# Patient Record
Sex: FEMALE | Race: WHITE | Employment: OTHER
[De-identification: names, ages, dates, MRNs, and addresses within clinical notes are randomized per-mention and may not be internally consistent; named-entity substitution may affect disease eponyms.]

---

## 2017-11-13 ENCOUNTER — HOSPITAL ENCOUNTER (OUTPATIENT)
Dept: PHYSICAL THERAPY | Facility: CLINIC | Age: 62
Setting detail: THERAPIES SERIES
Discharge: HOME | End: 2017-11-13
Payer: COMMERCIAL

## 2017-11-13 PROCEDURE — 97140 MANUAL THERAPY 1/> REGIONS: CPT

## 2017-11-13 PROCEDURE — 97161 PT EVAL LOW COMPLEX 20 MIN: CPT

## 2017-11-13 PROCEDURE — 97110 THERAPEUTIC EXERCISES: CPT

## 2017-11-13 NOTE — CONSULTS
[] Bob. [address] [facility] [employer]     [address] [place] [address]      Phone: [phone]      Fax:  [phone]     Physical Therapy Upper Extremity Evaluation    Date:  2017  Patient: Chetna Hernandez  : 1955  MRN: [id number]  Physician: Dr. William Lowery: MMO  Medical Diagnosis: L shoulder impingement  Rehab Codes: M75.42  Onset Date: 10/3/17       Next 's appt:     Subjective:   CC: 10/3/17 was moving from 1 home to another. Started to feel pain after that. No true injury, just came on gradually. Do have a history of spinal stenosis. Usually symptoms from neck are worse on the L than the R.  Get sharp stabbing at scapula. When raise my arm up or try to reach across pain is worse.    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 5/5 shoulder strength all planes  3. Normal posture                   Patient goals:No shoulder pain and be able to reach across and overhead      Rehab Potential:  [x] Good  [] Fair  [] Poor   Suggested Professional Referral:  [x] No  [] Yes:  Barriers to Goal Achievement[de-identified]  [x] No  [] Yes:  Domestic Concerns:  [x] No  [] Yes:    Pt. Education:  [x] Plans/Goals, Risks/Benefits discussed  [x] Home exercise program  Method of Education: [] Verbal  [] Demo  [x] Written  Comprehension of Education:  [x] Verbalizes understanding. [] Demonstrates understanding. [x] Needs Review. [] Demonstrates/verbalizes understanding of HEP/Ed previously given.     Treatment Plan:  [x] Therapeutic Exercise  [] Modalities:  [] Dry Needling  [] Therapeutic Activity  [] Ultrasound  [] Electrical Stimulation  [] Gait Training   [] Massage       [] Lumbar/Cervical Traction  [] Neuromuscular Re-education [x] Cold [] Iontophoresis: 4 mg/mL  [x] Instruction in HEP             Dexamethasone Sodium  [x] Manual Therapy             Phosphate 40-80 mAmin  [] Aquatic Therapy                   [x] Vasocompression/    [] Other:            Game Ready          Frequency: 2 x/week for 12 visits    Todays Treatment:  Manual: 1 st rib MET, L 4th post rib MET, MFR L scalene, PA glide T1-4  Precautions:  Exercises:  Exercise Reps/ Time Weight/ Level Comments   Neck flex 10x10\"  Don't lift head off pillow   Doorway pect stretch 3x30\"     Prone Scap  retraction x20                depression x20     Wall Walk x10     Other:    Specific Instructions for next treatment:UBE warm up, Sidelying next visit    Treatment Charges: Mins Units   [x] Evaluation       [x]  Low       []  Moderate       []  High 20 1   []  Modalities     [x]  Ther Exercise 15 1   [x]  Manual Therapy 15 1   []  Ther Activities     []  Aquatics     []  Vasocompression     []  Other       TOTAL TREATMENT TIME: 50    Time in: 1713    Time Out: 1817    Electronically signed by: Priyank Mantilla PT        Physician Signature:________________________________Date:__________________  By signing above or cosigning this note, I have reviewed this plan of care and certify a need for medically necessary rehabilitation services.      *PLEASE SIGN ABOVE AND FAX BACK ALL PAGES*

## 2017-11-16 ENCOUNTER — HOSPITAL ENCOUNTER (OUTPATIENT)
Dept: PHYSICAL THERAPY | Facility: CLINIC | Age: 62
Setting detail: THERAPIES SERIES
Discharge: HOME | End: 2017-11-16
Payer: COMMERCIAL

## 2017-11-16 PROCEDURE — 97140 MANUAL THERAPY 1/> REGIONS: CPT

## 2017-11-16 PROCEDURE — 97110 THERAPEUTIC EXERCISES: CPT

## 2017-11-16 NOTE — FLOWSHEET NOTE
[] Klarissa Wang       Outpatient Physical        Therapy       [address]       Phone: [phone]       Fax: [phone] [] [facility] at [address] [facility]       Phone: [phone]       Fax: [phone] [] [facility]  [address] [facility]   Phone: [phone]   Fax:  [phone]     Physical Therapy Daily Treatment Note    Date:  2017  Patient Name:  Jose Dacosta    :  1955  MRN: [id number]  Physician: Dr. Webster Oms: MMO  Medical Diagnosis: L shoulder impingement                       Rehab Codes: M75.42  Onset Date: 10/3/17                                                               Next 's appt: unknown  Visit# / total visits:      Cancels/No Shows: 0    Subjective:    Pain:  [] Yes  [] No Location: L shoulder     Pain: 3/10  Comments: Doing well. A little sore under the arm and at the deltoid    Objective:  Manual: 1 st rib MET, L 4th post rib MET, PA glide T1-4, Posterior glide L GHJ  Precautions:standard  Exercises:  Exercise Reps/ Time Weight/ Level Comments   UBE  4'     Neck flex 10x10\"   Don't lift head off pillow   Doorway pect stretch 3x30\"       Supine thoracic extension 1/2 foam roller 3'     Prone Scap  retraction x20                  depression x20                  HAB x10     Wall Walk x10       Sidelying                                      ABD 2x15                                     ER 2x15                                     HAB x10     Other:     Specific Instructions for next treatment:Add TB exercises        Treatment Charges: Mins Units   []  Modalities     [x]  Ther Exercise 25 2   [x]  Manual Therapy 15 1   []  Ther Activities     []  Aquatics     []  Vasocompression     []  Other     Total Treatment time 40 3       Assessment: [x] Progressing toward goals. Full PROM after Manual.  Sidelying exercise mildly irritating. Hard to control scapula with HAB. [] No change. [] Other:  STG: (to be met in 6 treatments)  1. ? Pain: by 50% with overhead reach  2. ? ROM:WNL all planes  3. ? Function:Able to reach into top cabinet  4. Independent with Home Exercise Programs     LTG: (to be met in 12 treatments)  1. 0/10 L shoulder pain with reaching overhead and across body   2. 5/5 shoulder strength all planes  3. Normal posture                    Patient goals:No shoulder pain and be able to reach across and overhead    Pt. Education:  [x] Yes  [] No  [x] Reviewed Prior HEP/Ed  Method of Education: [] Verbal  [] Demo  [x] Written-Add sidelying  Comprehension of Education:  [] Verbalizes understanding. [] Demonstrates understanding. [] Needs review. [] Demonstrates/verbalizes HEP/Ed previously given. Plan: [x] Continue per plan of care.    [] Other:      Time In:1400          Time Out: 6538    Electronically signed by:  Tobi Vegas, PT

## 2017-11-20 ENCOUNTER — HOSPITAL ENCOUNTER (OUTPATIENT)
Dept: PHYSICAL THERAPY | Facility: CLINIC | Age: 62
Setting detail: THERAPIES SERIES
Discharge: HOME | End: 2017-11-20
Payer: COMMERCIAL

## 2017-11-20 PROCEDURE — 97110 THERAPEUTIC EXERCISES: CPT

## 2017-11-22 ENCOUNTER — HOSPITAL ENCOUNTER (OUTPATIENT)
Dept: PHYSICAL THERAPY | Facility: CLINIC | Age: 62
Setting detail: THERAPIES SERIES
Discharge: HOME | End: 2017-11-22
Payer: COMMERCIAL

## 2017-11-22 PROCEDURE — 97016 VASOPNEUMATIC DEVICE THERAPY: CPT

## 2017-11-22 PROCEDURE — 97140 MANUAL THERAPY 1/> REGIONS: CPT

## 2017-11-22 PROCEDURE — 97110 THERAPEUTIC EXERCISES: CPT

## 2017-11-22 NOTE — FLOWSHEET NOTE
[] Bob. [address] [facility] [employer]  [address] [place] [address]   Phone: [phone]   Fax:  [phone]     Physical Therapy Daily Treatment Note    Date:  2017  Patient Name:  Yolis Caicedo    :  1955  MRN: [id number]  Physician: Dr. Elian Tolentino: MMO  Medical Diagnosis: L shoulder impingement                       Rehab Codes: M75.42  Onset Date: 10/3/17                                                               Next 's appt: unknown  Visit# / total visits:      Cancels/No Shows: 0    Subjective:    Pain:  [] Yes  [] No Location: L shoulder     Pain: 0/10 If I raise up in front 4/10  Comments: A little sore down the upper arm today. Tried green TB at home and it hurt  Objective:  Manual: L 1st rib MET, PA glide T1-3  Precautions:standard  Exercises:  Exercise Reps/ Time Weight/ Level Comments   UBE  5'     Neck flex 10x10\"   Don't lift head off pillow   Doorway pect stretch 3x30\"       Supine thoracic extension 1/2 foam roller 3'     Levator stretch 3x30\"     Prone Scap  retraction x20                  depression x20                  HAB hold     Wall Walk HEP       Sidelying                                      ABD 2x15 2#                                    ER 2x10 1#                                    HAB 2x10 0#    Theraband                             Tricep hold Red                            MICA hold Red                            HAB hold Red                            Row hold Red    Other:Vasocompression 34 degrees, medium pressurex 15min stopped at 10 min due to error in machine   Specific Instructions for next treatment:Add TB exercises        Treatment Charges: Mins Units   []  Modalities     [x]  Ther Exercise 35 2   [x]  Manual Therapy 10 1   []  Ther Activities     []  Aquatics     [x]  Vasocompression 10 4   []  Other     Total Treatment time 55 3       Assessment: [x] Progressing toward goals.Full PROM after Manual. More sensitive with flexion and getting \"catch\" today. Referred pain L shoulder throughout treatment therefore modified with decreased weight and/reps. Also added vasocompression with reports of compression feeling good to the shoulder. [] No change. [] Other:  STG: (to be met in 6 treatments)  1. ? Pain: by 50% with overhead reach  2. ? ROM:WNL all planes  3. ? Function:Able to reach into top cabinet  4. Independent with Home Exercise Programs     LTG: (to be met in 12 treatments)  1. 0/10 L shoulder pain with reaching overhead and across body   2. 5/5 shoulder strength all planes  3. Normal posture                    Patient goals:No shoulder pain and be able to reach across and overhead    Pt. Education:  [x] Yes  [] No  [x] Reviewed Prior HEP/Ed  Method of Education: [] Verbal  [] Demo  [x] Written-Add sidelying  Comprehension of Education:  [] Verbalizes understanding. [] Demonstrates understanding. [] Needs review. [] Demonstrates/verbalizes HEP/Ed previously given. Plan: [x] Continue per plan of care.    [] Other:      Time In:1400         Time Out: 1500    Electronically signed by:  Gloria Feldman, PT

## 2017-11-27 ENCOUNTER — HOSPITAL ENCOUNTER (OUTPATIENT)
Dept: PHYSICAL THERAPY | Facility: CLINIC | Age: 62
Setting detail: THERAPIES SERIES
Discharge: HOME | End: 2017-11-27
Payer: COMMERCIAL

## 2017-11-27 PROCEDURE — 97110 THERAPEUTIC EXERCISES: CPT

## 2017-11-27 PROCEDURE — 97140 MANUAL THERAPY 1/> REGIONS: CPT

## 2017-11-27 PROCEDURE — 97016 VASOPNEUMATIC DEVICE THERAPY: CPT

## 2017-11-27 NOTE — FLOWSHEET NOTE
Activities     []  Aquatics     [x]  Vasocompression 15 1   []  Other     Total Treatment time 65 4       Assessment: [x] Progressing toward goals. Pt is able to perform more reps with less complaint of discomfort today. Continues to require cues on technique to keep scapular stabilizers active. [] No change. [] Other:  STG: (to be met in 6 treatments)  1. ? Pain: by 50% with overhead reach  2. ? ROM:WNL all planes  3. ? Function:Able to reach into top cabinet  4. Independent with Home Exercise Programs     LTG: (to be met in 12 treatments)  1. 0/10 L shoulder pain with reaching overhead and across body   2. 5/5 shoulder strength all planes  3. Normal posture                    Patient goals:No shoulder pain and be able to reach across and overhead    Pt. Education:  [x] Yes  [] No  [x] Reviewed Prior HEP/Ed  Method of Education: [] Verbal  [] Demo  [x] Written-Add sidelying  Comprehension of Education:  [] Verbalizes understanding. [] Demonstrates understanding. [] Needs review. [] Demonstrates/verbalizes HEP/Ed previously given. Plan: [x] Continue per plan of care.    [] Other:      Time In:1355        Time Out: 3842    Electronically signed by:  Citlalli Resendez, PT

## 2017-11-29 ENCOUNTER — HOSPITAL ENCOUNTER (OUTPATIENT)
Dept: PHYSICAL THERAPY | Facility: CLINIC | Age: 62
Setting detail: THERAPIES SERIES
Discharge: HOME | End: 2017-11-29
Payer: COMMERCIAL

## 2017-11-29 PROCEDURE — 97140 MANUAL THERAPY 1/> REGIONS: CPT

## 2017-11-29 PROCEDURE — 97016 VASOPNEUMATIC DEVICE THERAPY: CPT

## 2017-11-29 PROCEDURE — 97110 THERAPEUTIC EXERCISES: CPT

## 2017-12-05 ENCOUNTER — HOSPITAL ENCOUNTER (OUTPATIENT)
Dept: PHYSICAL THERAPY | Facility: CLINIC | Age: 62
Setting detail: THERAPIES SERIES
Discharge: HOME | End: 2017-12-05
Payer: COMMERCIAL

## 2017-12-05 PROCEDURE — 97110 THERAPEUTIC EXERCISES: CPT

## 2017-12-05 PROCEDURE — 97140 MANUAL THERAPY 1/> REGIONS: CPT

## 2017-12-05 NOTE — FLOWSHEET NOTE
Mins Units   []  Modalities     [x]  Ther Exercise 35 2   [x]  Manual Therapy 15 1   []  Ther Activities     []  Aquatics     []  Vasocompression     []  Other     Total Treatment time 50 3       Assessment: [x] Progressing toward goals. Pt reported 0/10 pain at end of treatment. Able to add prone strengthening without increased symptom. Manual and verbal cues for performance. Pt is to attempt to change sleeping position. [] No change. [] Other:  STG: (to be met in 6 treatments)  1. ? Pain: by 50% with overhead reach  2. ? ROM:WNL all planes  3. ? Function:Able to reach into top cabinet  4. Independent with Home Exercise Programs     LTG: (to be met in 12 treatments)  1. 0/10 L shoulder pain with reaching overhead and across body   2. 5/5 shoulder strength all planes  3. Normal posture                    Patient goals:No shoulder pain and be able to reach across and overhead    Pt. Education:  [x] Yes  [] No  [x] Reviewed Prior HEP/Ed  Method of Education: [] Verbal  [] Demo  [x] Written-Add sidelying  Comprehension of Education:  [] Verbalizes understanding. [] Demonstrates understanding. [] Needs review. [] Demonstrates/verbalizes HEP/Ed previously given. Plan: [x] Continue per plan of care.    [] Other:      Time In:1504       Time Out: 2960    Electronically signed by:  Molly Pak PT

## 2017-12-07 ENCOUNTER — HOSPITAL ENCOUNTER (OUTPATIENT)
Dept: PHYSICAL THERAPY | Facility: CLINIC | Age: 62
Setting detail: THERAPIES SERIES
Discharge: HOME | End: 2017-12-07
Payer: COMMERCIAL

## 2017-12-07 PROCEDURE — 97110 THERAPEUTIC EXERCISES: CPT

## 2017-12-07 PROCEDURE — 97140 MANUAL THERAPY 1/> REGIONS: CPT

## 2017-12-11 ENCOUNTER — APPOINTMENT (OUTPATIENT)
Dept: PHYSICAL THERAPY | Facility: CLINIC | Age: 62
End: 2017-12-11
Payer: COMMERCIAL

## 2017-12-13 ENCOUNTER — HOSPITAL ENCOUNTER (OUTPATIENT)
Dept: PHYSICAL THERAPY | Facility: CLINIC | Age: 62
Setting detail: THERAPIES SERIES
Discharge: HOME | End: 2017-12-13
Payer: COMMERCIAL

## 2017-12-13 PROCEDURE — 97140 MANUAL THERAPY 1/> REGIONS: CPT

## 2017-12-13 PROCEDURE — 97110 THERAPEUTIC EXERCISES: CPT

## 2017-12-13 NOTE — FLOWSHEET NOTE
[] Bob. [address] [facility] [employer]  [address] [place] [address]   Phone: [phone]   Fax:  [phone]     Physical Therapy Daily Treatment Note    Date:  2017  Patient Name:  Nadira Evans    :  1955  MRN: [id number]  Physician: Dr. Hardy Khanna: MMO  Medical Diagnosis: L shoulder impingement                       Rehab Codes: M75.42  Onset Date: 10/3/17                                                               Next 's appt: unknown  Visit# / total visits:      Cancels/No Shows: 0    Subjective:    Pain:  [] Yes  [] No Location: L shoulder     Pain: 0/10  Comments: Feels really good today. No pain since last visit.   Objective:  Manual: PA glide T1-4, L 1st rib MET,  FRSL T6 MOB  Precautions:standard  Exercises:  Exercise Reps/ Time Weight/ Level Comments    UBE  5'   x   Neck flex 10x10\"   Don't lift head off pillow    Wand lat stretch 10x10\"      Doorway pect stretch 3x30\"     x   Supine thoracic extension 1/2 foam roller 3'   x   Levator stretch 3x30\"      Prone Scap  retraction HEP                   depression HEP                   HAB hold      Wall Walk HEP        Sidelying                                       ABD 2x15 2#                                     ER 2x10 2#/1#                                     HAB 2x10 0#     Theraband                               Ext x20   x                          Tricep 2x20 Blue  x                           MICA 2x20 Green  x                           HAB 2x20 Green  x                           Row 2x20 Green  high x                           Lat 2x20 Blue  x                         [email or web address] 2x20 Red  x   Prone                                         I 2x10 2#  x                                     T 2x10 2#  x                                      Y 2x10 0#  x   Other:    Specific Instructions for next treatment:Continue with manual and progress strengthening        Treatment Charges: Mins Units   []  Modalities     [x]  Ther Exercise 35 2   [x]  Manual Therapy 15 1   []  Ther Activities     []  Aquatics     []  Vasocompression     []  Other     Total Treatment time 50 3       Assessment: [x] Progressing toward goals. Pt is painfree today. A little end range stiffness in the L shoulder prior to manual but resolved after. Pt able to handle higher volume of reps and some heavier bands. Sent Blue TB home with pt for HEP. Anticipate DC in 3-4 visits. STG: (to be met in 6 treatments)  1. ? Pain: by 50% with overhead reach  2. ? ROM:WNL all planes  3. ? Function:Able to reach into top cabinet  4. Independent with Home Exercise Programs     LTG: (to be met in 12 treatments)  1. 0/10 L shoulder pain with reaching overhead and across body   2. 5/5 shoulder strength all planes  3. Normal posture                    Patient goals:No shoulder pain and be able to reach across and overhead    Pt. Education:  [x] Yes  [] No  [x] Reviewed Prior HEP/Ed  Method of Education: [] Verbal  [] Demo  [x] Written  Comprehension of Education:  [x] Verbalizes understanding. [] Demonstrates understanding. [] Needs review. [] Demonstrates/verbalizes HEP/Ed previously given. Plan: [x] Continue per plan of care.    [] Other:      Time In:1100       Time Out: 1150    Electronically signed by:  Mickie Kerr, PT

## 2017-12-19 ENCOUNTER — HOSPITAL ENCOUNTER (OUTPATIENT)
Dept: PHYSICAL THERAPY | Facility: CLINIC | Age: 62
Setting detail: THERAPIES SERIES
Discharge: HOME | End: 2017-12-19
Payer: COMMERCIAL

## 2017-12-19 PROCEDURE — 97140 MANUAL THERAPY 1/> REGIONS: CPT

## 2017-12-19 PROCEDURE — 97110 THERAPEUTIC EXERCISES: CPT

## 2017-12-28 ENCOUNTER — HOSPITAL ENCOUNTER (OUTPATIENT)
Dept: PHYSICAL THERAPY | Facility: CLINIC | Age: 62
Setting detail: THERAPIES SERIES
Discharge: HOME | End: 2017-12-28
Payer: COMMERCIAL

## 2017-12-28 PROCEDURE — 97110 THERAPEUTIC EXERCISES: CPT

## 2017-12-28 PROCEDURE — 97140 MANUAL THERAPY 1/> REGIONS: CPT

## 2017-12-28 NOTE — DISCHARGE SUMMARY
[] [facility]. [address] [place] [address] [employer]  [address] [place] [address]   Phone: [phone]   Fax:  [phone]     Physical Therapy Daily Treatment Note/Discharge    Date:  2017  Patient Name:  Chetna Hernandez    :  1955  MRN: [id number]  Physician: Dr. Marco Antonio Petersen: MMO  Medical Diagnosis: L shoulder impingement                       Rehab Codes: M75.42  Onset Date: 10/3/17                                                               Next 's appt: unknown  Visit# / total visits:      Cancels/No Shows: 0    Subjective:    Pain:  [] Yes  [] No Location: L shoulder     Pain: 0/10  Comments: No pain. Hve been doing HEP.   Objective:  Manual:  R 1st rib MET,  L post rib 4 MET  Precautions:standard  Exercises:  Exercise Reps/ Time Weight/ Level Comments    UBE  5'      Neck flex 10x10\"   Don't lift head off pillow    Wand lat stretch 10x10\"      Doorway pect stretch 3x30\"     x   Supine thoracic extension 1/2 foam roller 3'   x   Levator stretch 3x30\"      Prone Scap  retraction HEP                   depression HEP                   HAB hold      Wall Walk HEP        Sidelying                                       ABD 2x15 2#                                     ER 2x10 2#/1#                                     HAB 2x10 0#     Theraband                               Ext x20 Blue  x                          Tricep x20 Blue  x                           MICA x20 Blue  x                           HAB x20 Blue  x                           Row x20 Blue/green  Low/high x                           Lat x20 Blue  x                         [email or web address] x20 green     Prone                                         I x20 3#  x                                     T x20 2#  x                                      Y x20 1#  x   ER @ 90 abd x15 0#     Other:    Specific Instructions for next treatment:Continue with manual and progress strengthening  Measurements:  L shoulder AROM WNL all planes  5/5 shldr strength all planes  Midtrap 4+/5        Treatment Charges: Mins Units   []  Modalities     [x]  Ther Exercise 30 2   [x]  Manual Therapy 10 1   []  Ther Activities     []  Aquatics     []  Vasocompression     []  Other     Total Treatment time 40 3       Assessment: [x] Progressing toward goals. Patient is meeting all goals however is still lacking midtrap strength. She has HEP that addresses this weakness. Patient is comfortable continuing IND. STG: (to be met in 6 treatments)  1. ? Pain: by 50% with overhead reach MET  2. ? ROM:WNL all planes Met  3. ? Function:Able to reach into top cabinet MET  4. Independent with Home Exercise ProgramsMET     LTG: (to be met in 12 treatments)  1. 0/10 L shoulder pain with reaching overhead and across body MET  2. 5/5 shoulder strength all planesMET  3. Normal postureMET                    Patient goals:No shoulder pain and be able to reach across and overhead    Pt. Education:  [x] Yes  [] No  [x] Reviewed Prior HEP/Ed  Method of Education: [] Verbal  [] Demo  [x] Written  Comprehension of Education:  [x] Verbalizes understanding. [] Demonstrates understanding. [] Needs review. [] Demonstrates/verbalizes HEP/Ed previously given. Plan: [x] Continue per plan of care. [x] Other:DC from PT at this time. .    Time In:1402    Time Out: 9222    Electronically signed by:  Anthony Weinberg PT

## 2018-11-29 ENCOUNTER — HOSPITAL ENCOUNTER (OUTPATIENT)
Age: 63
Discharge: HOME | End: 2018-11-29
Payer: COMMERCIAL

## 2018-11-29 LAB
-: NORMAL
ALBUMIN SERPL-MCNC: 4.5 G/DL (ref 3.5–5.2)
ALBUMIN/GLOBULIN RATIO: 1.8 (ref 1–2.5)
ALP BLD-CCNC: 61 U/L (ref 35–104)
ALT SERPL-CCNC: 17 U/L (ref 5–33)
AMORPHOUS: NORMAL
ANION GAP SERPL CALCULATED.3IONS-SCNC: 13 MMOL/L (ref 9–17)
AST SERPL-CCNC: 17 U/L
BACTERIA: NORMAL
BILIRUB SERPL-MCNC: 0.36 MG/DL (ref 0.3–1.2)
BILIRUBIN URINE: NEGATIVE
BUN BLDV-MCNC: 12 MG/DL (ref 8–23)
BUN/CREAT BLD: ABNORMAL (ref 9–20)
CALCIUM SERPL-MCNC: 9.9 MG/DL (ref 8.6–10.4)
CASTS UA: NORMAL /LPF (ref 0–8)
CHLORIDE BLD-SCNC: 105 MMOL/L (ref 98–107)
CHOLESTEROL/HDL RATIO: 2.6
CHOLESTEROL: 154 MG/DL
CO2: 27 MMOL/L (ref 20–31)
COLOR: YELLOW
COMMENT UA: ABNORMAL
CREAT SERPL-MCNC: 0.69 MG/DL (ref 0.5–0.9)
CREATININE URINE: 139.8 MG/DL (ref 28–217)
CRYSTALS, UA: NORMAL /HPF
EPITHELIAL CELLS UA: NORMAL /HPF (ref 0–5)
ESTIMATED AVERAGE GLUCOSE: 105 MG/DL
GFR AFRICAN AMERICAN: >60 ML/MIN
GFR NON-AFRICAN AMERICAN: >60 ML/MIN
GFR SERPL CREATININE-BSD FRML MDRD: ABNORMAL ML/MIN/{1.73_M2}
GFR SERPL CREATININE-BSD FRML MDRD: ABNORMAL ML/MIN/{1.73_M2}
GLUCOSE BLD-MCNC: 100 MG/DL (ref 70–99)
GLUCOSE URINE: NEGATIVE
HBA1C MFR BLD: 5.3 % (ref 4–6)
HDLC SERPL-MCNC: 59 MG/DL
HEPATITIS C ANTIBODY: NONREACTIVE
KETONES, URINE: NEGATIVE
LDL CHOLESTEROL: 67 MG/DL (ref 0–130)
LEUKOCYTE ESTERASE, URINE: ABNORMAL
MICROALBUMIN/CREAT 24H UR: <12 MG/L
MICROALBUMIN/CREAT UR-RTO: NORMAL MCG/MG CREAT
MUCUS: NORMAL
NITRITE, URINE: NEGATIVE
OTHER OBSERVATIONS UA: NORMAL
PH UA: 7 (ref 5–8)
POTASSIUM SERPL-SCNC: 4.4 MMOL/L (ref 3.7–5.3)
PROTEIN UA: NEGATIVE
RBC UA: NORMAL /HPF (ref 0–4)
RENAL EPITHELIAL, UA: NORMAL /HPF
SODIUM BLD-SCNC: 145 MMOL/L (ref 135–144)
SPECIFIC GRAVITY UA: 1.02 (ref 1–1.03)
TOTAL PROTEIN: 7 G/DL (ref 6.4–8.3)
TRICHOMONAS: NORMAL
TRIGL SERPL-MCNC: 139 MG/DL
TSH SERPL DL<=0.05 MIU/L-ACNC: 1.84 MIU/L (ref 0.3–5)
TURBIDITY: CLEAR
URINE HGB: ABNORMAL
UROBILINOGEN, URINE: NORMAL
VLDLC SERPL CALC-MCNC: NORMAL MG/DL (ref 1–30)
WBC UA: NORMAL /HPF (ref 0–5)
YEAST: NORMAL

## 2018-11-29 PROCEDURE — 84443 ASSAY THYROID STIM HORMONE: CPT

## 2018-11-29 PROCEDURE — 81001 URINALYSIS AUTO W/SCOPE: CPT

## 2018-11-29 PROCEDURE — 82570 ASSAY OF URINE CREATININE: CPT

## 2018-11-29 PROCEDURE — 83036 HEMOGLOBIN GLYCOSYLATED A1C: CPT

## 2018-11-29 PROCEDURE — 86803 HEPATITIS C AB TEST: CPT

## 2018-11-29 PROCEDURE — 80053 COMPREHEN METABOLIC PANEL: CPT

## 2018-11-29 PROCEDURE — 80061 LIPID PANEL: CPT

## 2018-11-29 PROCEDURE — 36415 COLL VENOUS BLD VENIPUNCTURE: CPT

## 2018-11-29 PROCEDURE — 82043 UR ALBUMIN QUANTITATIVE: CPT

## 2018-11-29 PROCEDURE — 82306 VITAMIN D 25 HYDROXY: CPT

## 2018-11-30 LAB — VITAMIN D 25-HYDROXY: 29.8 NG/ML (ref 30–100)

## 2019-01-21 ENCOUNTER — HOSPITAL ENCOUNTER (OUTPATIENT)
Dept: MAMMOGRAPHY | Age: 64
Discharge: HOME | End: 2019-01-23
Payer: COMMERCIAL

## 2019-01-21 ENCOUNTER — HOSPITAL ENCOUNTER (OUTPATIENT)
Dept: NON INVASIVE DIAGNOSTICS | Age: 64
Discharge: HOME | End: 2019-01-21
Payer: COMMERCIAL

## 2019-01-21 DIAGNOSIS — Z12.39 BREAST CANCER SCREENING: ICD-10-CM

## 2019-01-21 LAB
EKG ATRIAL RATE: 73 BPM
EKG P AXIS: 64 DEGREES
EKG P-R INTERVAL: 176 MS
EKG Q-T INTERVAL: 400 MS
EKG QRS DURATION: 82 MS
EKG QTC CALCULATION (BAZETT): 440 MS
EKG R AXIS: 20 DEGREES
EKG T AXIS: 66 DEGREES
EKG VENTRICULAR RATE: 73 BPM
LV EF: 65 %
LVEF MODALITY: NORMAL

## 2019-01-21 PROCEDURE — 93005 ELECTROCARDIOGRAM TRACING: CPT

## 2019-01-21 PROCEDURE — 93306 TTE W/DOPPLER COMPLETE: CPT

## 2019-01-21 PROCEDURE — 77067 SCR MAMMO BI INCL CAD: CPT

## 2019-02-05 ENCOUNTER — HOSPITAL ENCOUNTER (OUTPATIENT)
Age: 64
Discharge: HOME | End: 2019-02-05
Payer: COMMERCIAL

## 2019-02-05 LAB
ALBUMIN SERPL-MCNC: 4.5 G/DL (ref 3.5–5.2)
ALBUMIN/GLOBULIN RATIO: 2 (ref 1–2.5)
ALP BLD-CCNC: 54 U/L (ref 35–104)
ALT SERPL-CCNC: 23 U/L (ref 5–33)
ANION GAP SERPL CALCULATED.3IONS-SCNC: 14 MMOL/L (ref 9–17)
AST SERPL-CCNC: 21 U/L
BILIRUB SERPL-MCNC: 0.37 MG/DL (ref 0.3–1.2)
BUN BLDV-MCNC: 13 MG/DL (ref 8–23)
BUN/CREAT BLD: NORMAL (ref 9–20)
CALCIUM SERPL-MCNC: 9.7 MG/DL (ref 8.6–10.4)
CHLORIDE BLD-SCNC: 104 MMOL/L (ref 98–107)
CO2: 26 MMOL/L (ref 20–31)
CREAT SERPL-MCNC: 0.71 MG/DL (ref 0.5–0.9)
CREATININE URINE: 187.9 MG/DL (ref 28–217)
ESTIMATED AVERAGE GLUCOSE: 108 MG/DL
GFR AFRICAN AMERICAN: >60 ML/MIN
GFR NON-AFRICAN AMERICAN: >60 ML/MIN
GFR SERPL CREATININE-BSD FRML MDRD: NORMAL ML/MIN/{1.73_M2}
GFR SERPL CREATININE-BSD FRML MDRD: NORMAL ML/MIN/{1.73_M2}
GLUCOSE BLD-MCNC: 95 MG/DL (ref 70–99)
HBA1C MFR BLD: 5.4 % (ref 4–6)
MICROALBUMIN/CREAT 24H UR: <12 MG/L
MICROALBUMIN/CREAT UR-RTO: NORMAL MCG/MG CREAT
POTASSIUM SERPL-SCNC: 4.3 MMOL/L (ref 3.7–5.3)
SODIUM BLD-SCNC: 144 MMOL/L (ref 135–144)
TOTAL PROTEIN: 6.8 G/DL (ref 6.4–8.3)

## 2019-02-05 PROCEDURE — 80053 COMPREHEN METABOLIC PANEL: CPT

## 2019-02-05 PROCEDURE — 83036 HEMOGLOBIN GLYCOSYLATED A1C: CPT

## 2019-02-05 PROCEDURE — 82570 ASSAY OF URINE CREATININE: CPT

## 2019-02-05 PROCEDURE — 82043 UR ALBUMIN QUANTITATIVE: CPT

## 2019-02-05 PROCEDURE — 80061 LIPID PANEL: CPT

## 2019-02-05 PROCEDURE — 36415 COLL VENOUS BLD VENIPUNCTURE: CPT

## 2019-02-11 LAB
CHOLESTEROL/HDL RATIO: 3.1
CHOLESTEROL: 172 MG/DL
HDLC SERPL-MCNC: 56 MG/DL
LDL CHOLESTEROL: 84 MG/DL (ref 0–130)
TRIGL SERPL-MCNC: 160 MG/DL
VLDLC SERPL CALC-MCNC: ABNORMAL MG/DL (ref 1–30)

## 2019-02-26 ENCOUNTER — HOSPITAL ENCOUNTER (OUTPATIENT)
Age: 64
Setting detail: SPECIMEN
Discharge: HOME | End: 2019-02-26
Payer: COMMERCIAL

## 2019-03-01 LAB — SURGICAL PATHOLOGY REPORT: NORMAL

## 2019-05-16 ENCOUNTER — HOSPITAL ENCOUNTER (OUTPATIENT)
Age: 64
Discharge: HOME | End: 2019-05-16
Payer: COMMERCIAL

## 2019-05-16 LAB
ALT SERPL-CCNC: 14 U/L (ref 5–33)
CHOLESTEROL/HDL RATIO: 2.1
CHOLESTEROL: 113 MG/DL
ESTIMATED AVERAGE GLUCOSE: 105 MG/DL
HBA1C MFR BLD: 5.3 % (ref 4–6)
HDLC SERPL-MCNC: 54 MG/DL
LDL CHOLESTEROL: 35 MG/DL (ref 0–130)
TRIGL SERPL-MCNC: 122 MG/DL
VLDLC SERPL CALC-MCNC: NORMAL MG/DL (ref 1–30)

## 2019-05-16 PROCEDURE — 83036 HEMOGLOBIN GLYCOSYLATED A1C: CPT

## 2019-05-16 PROCEDURE — 36415 COLL VENOUS BLD VENIPUNCTURE: CPT

## 2019-05-16 PROCEDURE — 80061 LIPID PANEL: CPT

## 2019-05-16 PROCEDURE — 84460 ALANINE AMINO (ALT) (SGPT): CPT

## 2019-06-14 ENCOUNTER — APPOINTMENT (OUTPATIENT)
Dept: GENERAL RADIOLOGY | Age: 64
End: 2019-06-14
Payer: COMMERCIAL

## 2019-06-14 ENCOUNTER — HOSPITAL ENCOUNTER (EMERGENCY)
Age: 64
Discharge: HOME | End: 2019-06-14
Attending: EMERGENCY MEDICINE
Payer: COMMERCIAL

## 2019-06-14 VITALS
OXYGEN SATURATION: 97 % | TEMPERATURE: 98.1 F | SYSTOLIC BLOOD PRESSURE: 145 MMHG | DIASTOLIC BLOOD PRESSURE: 71 MMHG | HEART RATE: 80 BPM | HEIGHT: 62 IN | WEIGHT: 142 LBS | RESPIRATION RATE: 14 BRPM | BODY MASS INDEX: 26.13 KG/M2

## 2019-06-14 DIAGNOSIS — S71.111A LACERATION OF RIGHT THIGH, INITIAL ENCOUNTER: Primary | ICD-10-CM

## 2019-06-14 PROCEDURE — 73552 X-RAY EXAM OF FEMUR 2/>: CPT

## 2019-06-14 PROCEDURE — 12002 RPR S/N/AX/GEN/TRNK2.6-7.5CM: CPT

## 2019-06-14 PROCEDURE — 6360000002 HC RX W HCPCS: Performed by: EMERGENCY MEDICINE

## 2019-06-14 PROCEDURE — 90715 TDAP VACCINE 7 YRS/> IM: CPT | Performed by: EMERGENCY MEDICINE

## 2019-06-14 PROCEDURE — 90471 IMMUNIZATION ADMIN: CPT | Performed by: EMERGENCY MEDICINE

## 2019-06-14 PROCEDURE — 99282 EMERGENCY DEPT VISIT SF MDM: CPT

## 2019-06-14 PROCEDURE — 6370000000 HC RX 637 (ALT 250 FOR IP): Performed by: EMERGENCY MEDICINE

## 2019-06-14 RX ORDER — GINSENG 100 MG
CAPSULE ORAL ONCE
Status: COMPLETED | OUTPATIENT
Start: 2019-06-14 | End: 2019-06-14

## 2019-06-14 RX ADMIN — TETANUS TOXOID, REDUCED DIPHTHERIA TOXOID AND ACELLULAR PERTUSSIS VACCINE, ADSORBED 0.5 ML: 5; 2.5; 8; 8; 2.5 SUSPENSION INTRAMUSCULAR at 17:08

## 2019-06-14 RX ADMIN — BACITRACIN: 500 OINTMENT TOPICAL at 17:08

## 2019-06-14 NOTE — ED PROVIDER NOTES
[facility]  [address] [address]  Phone: [phone]  Fax: [phone]    Pt Name: Desi Ceuvas  MRN: [id number]  Demetrigfindio 1955  Date of evaluation: 6/14/2019      CHIEF COMPLAINT       Chief Complaint   Patient presents with    Laceration     right thigh cut with broken mirror today at approx 1600         HISTORY OF PRESENT ILLNESS     Desi Cuevas is a 66 y.o. female who presents with a laceration to the right thigh which occurred when a broken mirror fell on her thigh by accident about a half hour ago. Her tetanus is not up-to-date. She is brought in by a relative. She does not feel that she has any foreign bodies present. Systolic blood pressure is 145. The rest her vital signs are normal.  No pain at this time        REVIEW OF SYSTEMS       Other ROS negative except as noted above. PAST MEDICAL HISTORY    has a past medical history of Cervical stenosis of spine, Fibromyalgia, H/O gestational diabetes mellitus, not currently pregnant, History of bone density study, HTN (hypertension), Hypercholesterolemia, Insulin resistance, Mitral regurgitation, Osteopenia, SI (sacroiliac) joint dysfunction, and Type II or unspecified type diabetes mellitus without mention of complication, not stated as uncontrolled. SURGICAL HISTORY      has a past surgical history that includes Tonsillectomy and adenoidectomy (1963); Cystoscopy; Dilation & curettage (1984); Colposcopy (1996); Colonoscopy (2008); and Cholecystectomy, laparoscopic (age 50). CURRENT MEDICATIONS       Previous Medications    ASPIRIN 81 MG EC TABLET    Take 81 mg by mouth daily. CALCIUM CARBONATE-VIT D--200 MG-UNIT TABS    Take 1 tablet by mouth daily. CHOLECALCIFEROL (VITAMIN D) 2000 UNITS CAPS CAPSULE    Take 1 capsule by mouth daily. CYCLOBENZAPRINE (FLEXERIL) 10 MG TABLET    Take 10 mg by mouth nightly.     HYDROCHLOROTHIAZIDE (MICROZIDE) 12.5 MG CAPSULE    Take 12.5 mg by mouth daily. LISINOPRIL (PRINIVIL;ZESTRIL) 5 MG TABLET    Take 5 mg by mouth daily. METAXALONE (SKELAXIN) 800 MG TABLET    Take 800 mg by mouth 2 times daily as needed. MILNACIPRAN HCL (SAVELLA) 50 MG TABS    Take 50 mg by mouth 2 times daily. MULTIPLE VITAMIN (MULTIVITAMIN PO)    Take 1 tablet by mouth daily. VALACYCLOVIR (VALTREX) 500 MG TABLET    Take 500 mg by mouth 2 times daily as needed. ALLERGIES     is allergic to sinutab [chlorphen-pseudoephed-apap]. FAMILY HISTORY     indicated that the status of her mother is unknown. She indicated that the status of her father is unknown. She indicated that the status of her paternal grandmother is unknown. She indicated that the status of her paternal grandfather is unknown. She indicated that the status of her maternal aunt is unknown.     family history includes High Blood Pressure in her father; Other in her maternal aunt, paternal grandfather, and paternal grandmother; Other (age of onset: 71) in her mother. SOCIAL HISTORY      reports that she has never smoked. She has never used smokeless tobacco. She reports that she does not drink alcohol or use drugs. PHYSICAL EXAM     INITIAL VITALS:  height is 5' 2\" (1.575 m) and weight is 64.4 kg (142 lb). Her oral temperature is 98.1 °F (36.7 °C). Her blood pressure is 145/71 (abnormal) and her pulse is 80. Her respiration is 14 and oxygen saturation is 97%. Constitutional: The patient is alert, well-developed, in no acute distress. Vital signs as noted. Right thigh: The 4 cm laceration noted without palpable foreign body. No bleeding at this time. The thigh appears normal.  No distal neurovascular compromise is found. DIFFERENTIAL DIAGNOSIS/ MEDICAL DECISION MAKING:      Tetanus immunization is given    Laceration is repaired and wound instructions are given    Follow Exit Care instructions closely.     I have reviewed the disposition diagnosis with the patient and or with a voice recognition program.  Efforts were made to edit the dictations but occasionally words are mis-transcribed.)    [facility],, DO  Attending Emergency Physician       [facility], DO  06/14/19 5947

## 2019-06-14 NOTE — ED NOTES
Pt to ED with laceration to lateral side (posterior) of right thigh. Pt has 3.5 cm laceration noted. She was carrying a mirror when it slipped out of her hand and cut her leg.  Bleeding is controlled on arrival to ed     Garcia Pineda RN  06/14/19 0282

## 2019-07-02 ENCOUNTER — HOSPITAL ENCOUNTER (OUTPATIENT)
Dept: NON INVASIVE DIAGNOSTICS | Age: 64
Discharge: HOME | End: 2019-07-02
Payer: COMMERCIAL

## 2019-07-02 PROCEDURE — 93270 REMOTE 30 DAY ECG REV/REPORT: CPT

## 2019-07-02 PROCEDURE — 93271 ECG/MONITORING AND ANALYSIS: CPT

## 2019-07-31 ENCOUNTER — ANESTHESIA EVENT (OUTPATIENT)
Dept: ENDOSCOPY | Age: 64
End: 2019-07-31
Payer: COMMERCIAL

## 2019-08-01 ENCOUNTER — HOSPITAL ENCOUNTER (OUTPATIENT)
Age: 64
Setting detail: OUTPATIENT SURGERY
Discharge: HOME | End: 2019-08-01
Attending: INTERNAL MEDICINE | Admitting: INTERNAL MEDICINE
Payer: COMMERCIAL

## 2019-08-01 ENCOUNTER — ANESTHESIA (OUTPATIENT)
Dept: ENDOSCOPY | Age: 64
End: 2019-08-01
Payer: COMMERCIAL

## 2019-08-01 VITALS
RESPIRATION RATE: 14 BRPM | DIASTOLIC BLOOD PRESSURE: 45 MMHG | OXYGEN SATURATION: 98 % | SYSTOLIC BLOOD PRESSURE: 74 MMHG

## 2019-08-01 VITALS
BODY MASS INDEX: 25.76 KG/M2 | WEIGHT: 140 LBS | TEMPERATURE: 96.1 F | OXYGEN SATURATION: 98 % | HEIGHT: 62 IN | HEART RATE: 62 BPM | SYSTOLIC BLOOD PRESSURE: 106 MMHG | RESPIRATION RATE: 18 BRPM | DIASTOLIC BLOOD PRESSURE: 54 MMHG

## 2019-08-01 LAB — GLUCOSE BLD-MCNC: 92 MG/DL (ref 65–105)

## 2019-08-01 PROCEDURE — 3700000001 HC ADD 15 MINUTES (ANESTHESIA): Performed by: INTERNAL MEDICINE

## 2019-08-01 PROCEDURE — 3609009500 HC COLONOSCOPY DIAGNOSTIC OR SCREENING: Performed by: INTERNAL MEDICINE

## 2019-08-01 PROCEDURE — 82947 ASSAY GLUCOSE BLOOD QUANT: CPT

## 2019-08-01 PROCEDURE — 2720000010 HC SURG SUPPLY STERILE: Performed by: INTERNAL MEDICINE

## 2019-08-01 PROCEDURE — 2500000003 HC RX 250 WO HCPCS: Performed by: NURSE ANESTHETIST, CERTIFIED REGISTERED

## 2019-08-01 PROCEDURE — 7100000010 HC PHASE II RECOVERY - FIRST 15 MIN: Performed by: INTERNAL MEDICINE

## 2019-08-01 PROCEDURE — 3609010000 HC COLONOSCOPY STOMA CONTROL BLEEDING: Performed by: INTERNAL MEDICINE

## 2019-08-01 PROCEDURE — 3700000000 HC ANESTHESIA ATTENDED CARE: Performed by: INTERNAL MEDICINE

## 2019-08-01 PROCEDURE — 2709999900 HC NON-CHARGEABLE SUPPLY: Performed by: INTERNAL MEDICINE

## 2019-08-01 PROCEDURE — 88305 TISSUE EXAM BY PATHOLOGIST: CPT

## 2019-08-01 PROCEDURE — 6360000002 HC RX W HCPCS: Performed by: NURSE ANESTHETIST, CERTIFIED REGISTERED

## 2019-08-01 PROCEDURE — 36415 COLL VENOUS BLD VENIPUNCTURE: CPT

## 2019-08-01 PROCEDURE — 2580000003 HC RX 258: Performed by: INTERNAL MEDICINE

## 2019-08-01 PROCEDURE — 2580000003 HC RX 258: Performed by: NURSE ANESTHETIST, CERTIFIED REGISTERED

## 2019-08-01 PROCEDURE — 7100000011 HC PHASE II RECOVERY - ADDTL 15 MIN: Performed by: INTERNAL MEDICINE

## 2019-08-01 RX ORDER — SODIUM CHLORIDE, SODIUM LACTATE, POTASSIUM CHLORIDE, CALCIUM CHLORIDE 600; 310; 30; 20 MG/100ML; MG/100ML; MG/100ML; MG/100ML
INJECTION, SOLUTION INTRAVENOUS CONTINUOUS
Status: CANCELLED | OUTPATIENT
Start: 2019-08-01

## 2019-08-01 RX ORDER — ROSUVASTATIN CALCIUM 10 MG/1
10 TABLET, COATED ORAL NIGHTLY
COMMUNITY

## 2019-08-01 RX ORDER — SODIUM CHLORIDE 0.9 % (FLUSH) 0.9 %
10 SYRINGE (ML) INJECTION PRN
Status: CANCELLED | OUTPATIENT
Start: 2019-08-01

## 2019-08-01 RX ORDER — LIDOCAINE HYDROCHLORIDE 10 MG/ML
INJECTION, SOLUTION EPIDURAL; INFILTRATION; INTRACAUDAL; PERINEURAL PRN
Status: DISCONTINUED | OUTPATIENT
Start: 2019-08-01 | End: 2019-08-01 | Stop reason: SDUPTHER

## 2019-08-01 RX ORDER — SODIUM CHLORIDE 9 MG/ML
INJECTION, SOLUTION INTRAVENOUS ONCE
Status: COMPLETED | OUTPATIENT
Start: 2019-08-01 | End: 2019-08-01

## 2019-08-01 RX ORDER — PROPOFOL 10 MG/ML
INJECTION, EMULSION INTRAVENOUS PRN
Status: DISCONTINUED | OUTPATIENT
Start: 2019-08-01 | End: 2019-08-01 | Stop reason: SDUPTHER

## 2019-08-01 RX ORDER — LIDOCAINE HYDROCHLORIDE 10 MG/ML
1 INJECTION, SOLUTION EPIDURAL; INFILTRATION; INTRACAUDAL; PERINEURAL
Status: CANCELLED | OUTPATIENT
Start: 2019-08-01 | End: 2019-08-01

## 2019-08-01 RX ORDER — FENTANYL CITRATE 50 UG/ML
25 INJECTION, SOLUTION INTRAMUSCULAR; INTRAVENOUS EVERY 5 MIN PRN
Status: CANCELLED | OUTPATIENT
Start: 2019-08-01

## 2019-08-01 RX ORDER — SODIUM CHLORIDE 0.9 % (FLUSH) 0.9 %
10 SYRINGE (ML) INJECTION EVERY 12 HOURS SCHEDULED
Status: CANCELLED | OUTPATIENT
Start: 2019-08-01

## 2019-08-01 RX ORDER — SODIUM CHLORIDE 9 MG/ML
INJECTION, SOLUTION INTRAVENOUS CONTINUOUS PRN
Status: DISCONTINUED | OUTPATIENT
Start: 2019-08-01 | End: 2019-08-01 | Stop reason: SDUPTHER

## 2019-08-01 RX ORDER — MIDAZOLAM HYDROCHLORIDE 1 MG/ML
1 INJECTION INTRAMUSCULAR; INTRAVENOUS EVERY 10 MIN PRN
Status: CANCELLED | OUTPATIENT
Start: 2019-08-01

## 2019-08-01 RX ADMIN — PROPOFOL 250 MG: 10 INJECTION, EMULSION INTRAVENOUS at 08:43

## 2019-08-01 RX ADMIN — SODIUM CHLORIDE: 9 INJECTION, SOLUTION INTRAVENOUS at 08:40

## 2019-08-01 RX ADMIN — PHENYLEPHRINE HYDROCHLORIDE 150 MCG: 10 INJECTION INTRAVENOUS at 08:47

## 2019-08-01 RX ADMIN — SODIUM CHLORIDE: 9 INJECTION, SOLUTION INTRAVENOUS at 07:06

## 2019-08-01 RX ADMIN — PHENYLEPHRINE HYDROCHLORIDE 100 MCG: 10 INJECTION INTRAVENOUS at 08:36

## 2019-08-01 RX ADMIN — PHENYLEPHRINE HYDROCHLORIDE 100 MCG: 10 INJECTION INTRAVENOUS at 09:04

## 2019-08-01 RX ADMIN — PHENYLEPHRINE HYDROCHLORIDE 100 MCG: 10 INJECTION INTRAVENOUS at 08:09

## 2019-08-01 RX ADMIN — PROPOFOL 400 MG: 10 INJECTION, EMULSION INTRAVENOUS at 08:03

## 2019-08-01 RX ADMIN — PROPOFOL 200 MG: 10 INJECTION, EMULSION INTRAVENOUS at 08:23

## 2019-08-01 RX ADMIN — PHENYLEPHRINE HYDROCHLORIDE 100 MCG: 10 INJECTION INTRAVENOUS at 08:58

## 2019-08-01 RX ADMIN — LIDOCAINE HYDROCHLORIDE 25 MG: 10 INJECTION, SOLUTION EPIDURAL; INFILTRATION; INTRACAUDAL at 08:02

## 2019-08-01 RX ADMIN — PHENYLEPHRINE HYDROCHLORIDE 100 MCG: 10 INJECTION INTRAVENOUS at 08:31

## 2019-08-01 RX ADMIN — SODIUM CHLORIDE: 9 INJECTION, SOLUTION INTRAVENOUS at 07:51

## 2019-08-01 ASSESSMENT — PAIN - FUNCTIONAL ASSESSMENT: PAIN_FUNCTIONAL_ASSESSMENT: 0-10

## 2019-08-01 ASSESSMENT — PAIN SCALES - GENERAL: PAINLEVEL_OUTOF10: 0

## 2019-08-01 NOTE — OP NOTE
scope.   3. Internal hemorrhoids seen during regular endoscopy     Recommendations: Follow with the biopsy results                                       Repeat colon in 6 months to review the post polypectomy site. Can be done at Endo. Follow up with PCP.     Jozef Li MD

## 2019-08-01 NOTE — ANESTHESIA POSTPROCEDURE EVALUATION
Department of Anesthesiology  Postprocedure Note    Patient: Jordyn Martel  MRN: [id number]  YOB: 1955  Date of evaluation: 8/1/2019  Time:  9:18 AM     Procedure Summary     Date:  08/01/19 Room / Location:  [facility] / [facility] End[facility]    Anesthesia Start:  0800 Anesthesia Stop:  0906    Procedures:       COLONOSCOPY DIAGNOSTIC WITH EMR (N/A )      COLONOSCOPY CONTROL HEMORRHAGE/STOMA Diagnosis:  (CECAL POLYP)    Surgeon:  Kalyan Vazquez MD Responsible Provider:  Brian Levy MD    Anesthesia Type:  MAC ASA Status:  2          Anesthesia Type: MAC    Araceli Phase I: Araceli Score: 10    Araceli Phase II: Araceli Score: 6    Last vitals: Reviewed and per EMR flowsheets.        Anesthesia Post Evaluation    Patient location during evaluation: PACU  Patient participation: complete - patient participated  Level of consciousness: awake and alert  Pain score: 0  Airway patency: patent  Nausea & Vomiting: no vomiting and no nausea  Complications: no  Cardiovascular status: hemodynamically stable  Respiratory status: acceptable  Hydration status: stable

## 2019-08-01 NOTE — ANESTHESIA PRE PROCEDURE
Department of Anesthesiology  Preprocedure Note       Name:  Abiel Chavez   Age:  66 y.o.  :  1955                                          MRN:  [id number]         Date:  2019      Surgeon: Abby Galvez):  Lord Maren MD    Procedure: COLONOSCOPY DIAGNOSTIC WITH EMR (N/A )    Medications prior to admission:   Prior to Admission medications    Medication Sig Start Date End Date Taking? Authorizing Provider   milnacipran HCl (SAVELLA) 50 MG TABS Take 50 mg by mouth 2 times daily. Historical Provider, MD   hydrochlorothiazide (MICROZIDE) 12.5 MG capsule Take 12.5 mg by mouth daily. Historical Provider, MD   lisinopril (PRINIVIL;ZESTRIL) 5 MG tablet Take 5 mg by mouth daily. Historical Provider, MD   cyclobenzaprine (FLEXERIL) 10 MG tablet Take 10 mg by mouth nightly. Historical Provider, MD   Cholecalciferol (VITAMIN D) 2000 UNITS CAPS capsule Take 1 capsule by mouth daily. Historical Provider, MD   Calcium Carbonate-Vit D-Min 600-200 MG-UNIT TABS Take 1 tablet by mouth daily. Historical Provider, MD   aspirin 81 MG EC tablet Take 81 mg by mouth daily. Historical Provider, MD   Multiple Vitamin (MULTIVITAMIN PO) Take 1 tablet by mouth daily. Historical Provider, MD   valACYclovir (VALTREX) 500 MG tablet Take 500 mg by mouth 2 times daily as needed. Historical Provider, MD   metaxalone (SKELAXIN) 800 MG tablet Take 800 mg by mouth 2 times daily as needed. Historical Provider, MD       Current medications:    No current facility-administered medications for this encounter. Allergies:     Allergies   Allergen Reactions    Sinutab [Chlorphen-Pseudoephed-Apap] Palpitations       Problem List:    Patient Active Problem List   Diagnosis Code    Osteopenia M85.80       Past Medical History:        Diagnosis Date    Cervical stenosis of spine     Fibromyalgia     H/O gestational diabetes mellitus, not currently pregnant     History of bone density study

## 2019-08-02 LAB — SURGICAL PATHOLOGY REPORT: NORMAL

## 2019-08-22 ENCOUNTER — HOSPITAL ENCOUNTER (OUTPATIENT)
Age: 64
Setting detail: SPECIMEN
Discharge: HOME | End: 2019-08-22
Payer: COMMERCIAL

## 2019-08-22 LAB
-: NORMAL
AMORPHOUS: NORMAL
BACTERIA: NORMAL
BILIRUBIN URINE: NEGATIVE
CASTS UA: NORMAL /LPF (ref 0–8)
CHOLESTEROL/HDL RATIO: 2
CHOLESTEROL: 129 MG/DL
COLOR: YELLOW
COMMENT UA: ABNORMAL
CRYSTALS, UA: NORMAL /HPF
EPITHELIAL CELLS UA: NORMAL /HPF (ref 0–5)
ESTIMATED AVERAGE GLUCOSE: 111 MG/DL
GLUCOSE URINE: NEGATIVE
HBA1C MFR BLD: 5.5 % (ref 4–6)
HDLC SERPL-MCNC: 64 MG/DL
KETONES, URINE: NEGATIVE
LDL CHOLESTEROL: 38 MG/DL (ref 0–130)
LEUKOCYTE ESTERASE, URINE: ABNORMAL
MUCUS: NORMAL
NITRITE, URINE: NEGATIVE
OTHER OBSERVATIONS UA: NORMAL
PH UA: 7 (ref 5–8)
PROTEIN UA: NEGATIVE
RBC UA: NORMAL /HPF (ref 0–4)
RENAL EPITHELIAL, UA: NORMAL /HPF
SPECIFIC GRAVITY UA: 1.01 (ref 1–1.03)
TRICHOMONAS: NORMAL
TRIGL SERPL-MCNC: 135 MG/DL
TURBIDITY: CLEAR
URINE HGB: ABNORMAL
UROBILINOGEN, URINE: NORMAL
VLDLC SERPL CALC-MCNC: NORMAL MG/DL (ref 1–30)
WBC UA: NORMAL /HPF (ref 0–5)
YEAST: NORMAL

## 2019-08-22 PROCEDURE — 36415 COLL VENOUS BLD VENIPUNCTURE: CPT

## 2019-08-22 PROCEDURE — 80061 LIPID PANEL: CPT

## 2019-08-22 PROCEDURE — 81001 URINALYSIS AUTO W/SCOPE: CPT

## 2019-08-22 PROCEDURE — 83036 HEMOGLOBIN GLYCOSYLATED A1C: CPT

## 2019-09-03 ENCOUNTER — HOSPITAL ENCOUNTER (OUTPATIENT)
Age: 64
Setting detail: SPECIMEN
Discharge: HOME | End: 2019-09-03
Payer: COMMERCIAL

## 2019-09-03 LAB
-: NORMAL
AMORPHOUS: NORMAL
BACTERIA: NORMAL
BILIRUBIN URINE: NEGATIVE
CASTS UA: NORMAL /LPF (ref 0–8)
COLOR: YELLOW
COMMENT UA: ABNORMAL
CRYSTALS, UA: NORMAL /HPF
EPITHELIAL CELLS UA: NORMAL /HPF (ref 0–5)
GLUCOSE URINE: NEGATIVE
KETONES, URINE: NEGATIVE
LEUKOCYTE ESTERASE, URINE: ABNORMAL
MUCUS: NORMAL
NITRITE, URINE: NEGATIVE
OTHER OBSERVATIONS UA: NORMAL
PH UA: 5.5 (ref 5–8)
PROTEIN UA: NEGATIVE
RBC UA: NORMAL /HPF (ref 0–4)
RENAL EPITHELIAL, UA: NORMAL /HPF
SPECIFIC GRAVITY UA: 1.02 (ref 1–1.03)
TRICHOMONAS: NORMAL
TURBIDITY: CLEAR
URINE HGB: ABNORMAL
UROBILINOGEN, URINE: NORMAL
WBC UA: NORMAL /HPF (ref 0–5)
YEAST: NORMAL

## 2019-09-03 PROCEDURE — 87086 URINE CULTURE/COLONY COUNT: CPT

## 2019-09-03 PROCEDURE — 81001 URINALYSIS AUTO W/SCOPE: CPT

## 2019-09-04 ENCOUNTER — HOSPITAL ENCOUNTER (OUTPATIENT)
Dept: CT IMAGING | Facility: CLINIC | Age: 64
Discharge: HOME | End: 2019-09-06
Payer: COMMERCIAL

## 2019-09-04 DIAGNOSIS — R31.21 ASYMPTOMATIC MICROSCOPIC HEMATURIA: ICD-10-CM

## 2019-09-04 LAB
CULTURE: NORMAL
Lab: NORMAL
POC CREATININE: 0.7 MG/DL (ref 0.6–1.4)
SPECIMEN DESCRIPTION: NORMAL

## 2019-09-04 PROCEDURE — 6360000004 HC RX CONTRAST MEDICATION: Performed by: FAMILY MEDICINE

## 2019-09-04 PROCEDURE — 2580000003 HC RX 258: Performed by: FAMILY MEDICINE

## 2019-09-04 PROCEDURE — 74178 CT ABD&PLV WO CNTR FLWD CNTR: CPT

## 2019-09-04 PROCEDURE — 82565 ASSAY OF CREATININE: CPT

## 2019-09-04 RX ORDER — SODIUM CHLORIDE 0.9 % (FLUSH) 0.9 %
10 SYRINGE (ML) INJECTION PRN
Status: DISCONTINUED | OUTPATIENT
Start: 2019-09-04 | End: 2019-09-07 | Stop reason: HOSPADM

## 2019-09-04 RX ORDER — 0.9 % SODIUM CHLORIDE 0.9 %
100 INTRAVENOUS SOLUTION INTRAVENOUS ONCE
Status: COMPLETED | OUTPATIENT
Start: 2019-09-04 | End: 2019-09-04

## 2019-09-04 RX ADMIN — IOPAMIDOL 120 ML: 755 INJECTION, SOLUTION INTRAVENOUS at 09:53

## 2019-09-04 RX ADMIN — SODIUM CHLORIDE 75 ML: 9 INJECTION, SOLUTION INTRAVENOUS at 09:54

## 2019-09-16 PROBLEM — Z82.49 FAMILY HISTORY OF CORONARY ARTERY DISEASE: Status: ACTIVE | Noted: 2019-09-16

## 2019-09-16 PROBLEM — R06.02 SHORTNESS OF BREATH: Status: ACTIVE | Noted: 2019-09-16

## 2019-09-16 PROBLEM — E78.49 OTHER HYPERLIPIDEMIA: Status: ACTIVE | Noted: 2019-09-16

## 2019-09-16 PROBLEM — R00.2 PALPITATIONS: Status: ACTIVE | Noted: 2019-09-16

## 2020-03-12 PROBLEM — R31.29 MICROHEMATURIA: Status: ACTIVE | Noted: 2020-03-12

## 2020-03-12 PROBLEM — N20.0 RENAL CALCULUS, LEFT: Status: ACTIVE | Noted: 2020-03-12

## 2020-05-02 ENCOUNTER — HOSPITAL ENCOUNTER (OUTPATIENT)
Dept: PREADMISSION TESTING | Age: 65
Setting detail: SPECIMEN
Discharge: HOME | End: 2020-05-06
Payer: COMMERCIAL

## 2020-05-02 LAB
SARS-COV-2, PCR: NORMAL
SARS-COV-2, RAPID: NORMAL
SARS-COV-2: NOT DETECTED
SOURCE: NORMAL

## 2020-05-02 PROCEDURE — U0002 COVID-19 LAB TEST NON-CDC: HCPCS

## 2020-05-03 ENCOUNTER — TELEPHONE (OUTPATIENT)
Dept: PRIMARY CARE CLINIC | Age: 65
End: 2020-05-03

## 2020-05-03 NOTE — TELEPHONE ENCOUNTER
Left message to notify patient of results and instructed to call pcp or ordering provider if further questions or concerns.

## 2020-05-05 ENCOUNTER — ANESTHESIA EVENT (OUTPATIENT)
Dept: ENDOSCOPY | Age: 65
End: 2020-05-05
Payer: COMMERCIAL

## 2020-05-05 ENCOUNTER — HOSPITAL ENCOUNTER (OUTPATIENT)
Age: 65
Setting detail: OUTPATIENT SURGERY
Discharge: HOME | End: 2020-05-05
Attending: INTERNAL MEDICINE | Admitting: INTERNAL MEDICINE
Payer: COMMERCIAL

## 2020-05-05 ENCOUNTER — ANESTHESIA (OUTPATIENT)
Dept: ENDOSCOPY | Age: 65
End: 2020-05-05
Payer: COMMERCIAL

## 2020-05-05 VITALS
RESPIRATION RATE: 16 BRPM | DIASTOLIC BLOOD PRESSURE: 75 MMHG | TEMPERATURE: 97.2 F | HEART RATE: 63 BPM | SYSTOLIC BLOOD PRESSURE: 135 MMHG | WEIGHT: 145 LBS | BODY MASS INDEX: 26.68 KG/M2 | HEIGHT: 62 IN | OXYGEN SATURATION: 99 %

## 2020-05-05 VITALS — SYSTOLIC BLOOD PRESSURE: 106 MMHG | DIASTOLIC BLOOD PRESSURE: 51 MMHG | OXYGEN SATURATION: 100 %

## 2020-05-05 LAB
EKG ATRIAL RATE: 72 BPM
EKG P AXIS: 35 DEGREES
EKG P-R INTERVAL: 166 MS
EKG Q-T INTERVAL: 418 MS
EKG QRS DURATION: 84 MS
EKG QTC CALCULATION (BAZETT): 457 MS
EKG R AXIS: 8 DEGREES
EKG T AXIS: 26 DEGREES
EKG VENTRICULAR RATE: 72 BPM
GFR NON-AFRICAN AMERICAN: >60 ML/MIN
GFR SERPL CREATININE-BSD FRML MDRD: >60 ML/MIN
GFR SERPL CREATININE-BSD FRML MDRD: NORMAL ML/MIN/{1.73_M2}
GLUCOSE BLD-MCNC: 101 MG/DL (ref 74–100)
POC CREATININE: 0.69 MG/DL (ref 0.51–1.19)
POC POTASSIUM: 3.6 MMOL/L (ref 3.5–4.5)

## 2020-05-05 PROCEDURE — 2580000003 HC RX 258: Performed by: INTERNAL MEDICINE

## 2020-05-05 PROCEDURE — 6360000002 HC RX W HCPCS: Performed by: NURSE ANESTHETIST, CERTIFIED REGISTERED

## 2020-05-05 PROCEDURE — 82565 ASSAY OF CREATININE: CPT

## 2020-05-05 PROCEDURE — 93010 ELECTROCARDIOGRAM REPORT: CPT | Performed by: INTERNAL MEDICINE

## 2020-05-05 PROCEDURE — 2709999900 HC NON-CHARGEABLE SUPPLY: Performed by: INTERNAL MEDICINE

## 2020-05-05 PROCEDURE — 93005 ELECTROCARDIOGRAM TRACING: CPT | Performed by: INTERNAL MEDICINE

## 2020-05-05 PROCEDURE — 3700000001 HC ADD 15 MINUTES (ANESTHESIA): Performed by: INTERNAL MEDICINE

## 2020-05-05 PROCEDURE — 7100000010 HC PHASE II RECOVERY - FIRST 15 MIN: Performed by: INTERNAL MEDICINE

## 2020-05-05 PROCEDURE — 88305 TISSUE EXAM BY PATHOLOGIST: CPT

## 2020-05-05 PROCEDURE — 3700000000 HC ANESTHESIA ATTENDED CARE: Performed by: INTERNAL MEDICINE

## 2020-05-05 PROCEDURE — 3609010600 HC COLONOSCOPY POLYPECTOMY SNARE/COLD BIOPSY: Performed by: INTERNAL MEDICINE

## 2020-05-05 PROCEDURE — 84132 ASSAY OF SERUM POTASSIUM: CPT

## 2020-05-05 PROCEDURE — 7100000011 HC PHASE II RECOVERY - ADDTL 15 MIN: Performed by: INTERNAL MEDICINE

## 2020-05-05 PROCEDURE — 82947 ASSAY GLUCOSE BLOOD QUANT: CPT

## 2020-05-05 PROCEDURE — 3609010300 HC COLONOSCOPY W/BIOPSY SINGLE/MULTIPLE: Performed by: INTERNAL MEDICINE

## 2020-05-05 RX ORDER — SODIUM CHLORIDE, SODIUM LACTATE, POTASSIUM CHLORIDE, CALCIUM CHLORIDE 600; 310; 30; 20 MG/100ML; MG/100ML; MG/100ML; MG/100ML
INJECTION, SOLUTION INTRAVENOUS CONTINUOUS
Status: DISCONTINUED | OUTPATIENT
Start: 2020-05-05 | End: 2020-05-05 | Stop reason: HOSPADM

## 2020-05-05 RX ORDER — SODIUM CHLORIDE, SODIUM LACTATE, POTASSIUM CHLORIDE, CALCIUM CHLORIDE 600; 310; 30; 20 MG/100ML; MG/100ML; MG/100ML; MG/100ML
INJECTION, SOLUTION INTRAVENOUS CONTINUOUS
Status: CANCELLED | OUTPATIENT
Start: 2020-05-05

## 2020-05-05 RX ORDER — PROPOFOL 10 MG/ML
INJECTION, EMULSION INTRAVENOUS CONTINUOUS PRN
Status: DISCONTINUED | OUTPATIENT
Start: 2020-05-05 | End: 2020-05-05 | Stop reason: SDUPTHER

## 2020-05-05 RX ORDER — PROPOFOL 10 MG/ML
INJECTION, EMULSION INTRAVENOUS PRN
Status: DISCONTINUED | OUTPATIENT
Start: 2020-05-05 | End: 2020-05-05 | Stop reason: SDUPTHER

## 2020-05-05 RX ADMIN — PROPOFOL 50 MG: 10 INJECTION, EMULSION INTRAVENOUS at 08:04

## 2020-05-05 RX ADMIN — PROPOFOL 30 MG: 10 INJECTION, EMULSION INTRAVENOUS at 08:02

## 2020-05-05 RX ADMIN — PROPOFOL 40 MG: 10 INJECTION, EMULSION INTRAVENOUS at 08:01

## 2020-05-05 RX ADMIN — PROPOFOL 125 MCG/KG/MIN: 10 INJECTION, EMULSION INTRAVENOUS at 08:01

## 2020-05-05 RX ADMIN — PROPOFOL 40 MG: 10 INJECTION, EMULSION INTRAVENOUS at 08:07

## 2020-05-05 RX ADMIN — SODIUM CHLORIDE, POTASSIUM CHLORIDE, SODIUM LACTATE AND CALCIUM CHLORIDE: 600; 310; 30; 20 INJECTION, SOLUTION INTRAVENOUS at 06:49

## 2020-05-05 ASSESSMENT — PULMONARY FUNCTION TESTS
PIF_VALUE: 0
PIF_VALUE: 1
PIF_VALUE: 1
PIF_VALUE: 0

## 2020-05-05 ASSESSMENT — PAIN SCALES - GENERAL: PAINLEVEL_OUTOF10: 0

## 2020-05-05 ASSESSMENT — LIFESTYLE VARIABLES: SMOKING_STATUS: 0

## 2020-05-05 ASSESSMENT — PAIN - FUNCTIONAL ASSESSMENT: PAIN_FUNCTIONAL_ASSESSMENT: 0-10

## 2020-05-05 NOTE — H&P
History and Physical    Pt Name: Stephanie Hobson  MRN: [id number]  YOB: 1955  Date of evaluation: 5/5/2020  Primary Care Physician: Sela Oppenheim, MD    SUBJECTIVE:   History of Chief Complaint:    Stephanie Hobson is a 60 y.o. female who is scheduled today for colonoscopy. She says this is a follow- up post polypectomy in August 2019. She denies any GI complaints. Allergies  is allergic to sinutab [chlorphen-pseudoephed-apap]. Medications  Prior to Admission medications    Medication Sig Start Date End Date Taking? Authorizing Provider   ibuprofen (ADVIL;MOTRIN) 800 MG tablet ibuprofen 800 mg tablet   TAKE (1) TABLET THREE TIMES DAILY AS NEEDED. Yes Historical Provider, MD   metFORMIN (GLUCOPHAGE) 500 MG tablet Take 500 mg by mouth nightly   Yes Historical Provider, MD   rosuvastatin (CRESTOR) 10 MG tablet Take 10 mg by mouth nightly   Yes Historical Provider, MD   hydrochlorothiazide (MICROZIDE) 12.5 MG capsule Take 12.5 mg by mouth daily. Yes Historical Provider, MD   lisinopril (PRINIVIL;ZESTRIL) 5 MG tablet Take 5 mg by mouth daily. Yes Historical Provider, MD   Calcium Carbonate-Vit D-Min 600-200 MG-UNIT TABS Take 1 tablet by mouth nightly    Yes Historical Provider, MD   aspirin 81 MG EC tablet Take 81 mg by mouth daily. Yes Historical Provider, MD   Multiple Vitamin (MULTIVITAMIN PO) Take 1 tablet by mouth daily. Yes Historical Provider, MD   valACYclovir (VALTREX) 500 MG tablet Take 500 mg by mouth 2 times daily as needed.       Historical Provider, MD     Past Medical History    has a past medical history of Caffeine use, Cervical stenosis of spine, Fibromyalgia, H/O gestational diabetes mellitus, not currently pregnant, History of bone density study, History of colon polyps, HTN (hypertension), Hypercholesterolemia, Insulin resistance, Microhematuria, Mitral regurgitation, Osteopenia, Palpitations, SI (sacroiliac) joint dysfunction, Type II or unspecified type diabetes mellitus without mention of complication, not stated as uncontrolled, and Wears glasses. Past Surgical History   has a past surgical history that includes Tonsillectomy and adenoidectomy (); Cystoscopy; Dilation & curettage (); Colposcopy (); Cholecystectomy, laparoscopic (age 50); Colonoscopy (); Colonoscopy (N/A, 2019); Colonoscopy (2019); and Colonoscopy (2019). Social History   reports that she has never smoked. She has never used smokeless tobacco.   reports no history of alcohol use. reports no history of drug use. Marital Status    Children 4  Occupation retired OB RN  Family History  Family Status   Relation Name Status    PGM  (Not Specified)    Mother  (Not Specified)    MAunt  (Not Specified)    PGF  (Not Specified)    Father  (Not Specified)    MGM  (Not Specified)   Leopoldo Cameron       family history includes Heart Disease in her maternal grandmother; Heart Disease (age of onset: 52) in her paternal cousin; Heart Disease (age of onset: 68) in her paternal grandmother; High Blood Pressure in her father; Kidney Disease in her father; Other in her maternal aunt, paternal grandfather, and paternal grandmother; Other (age of onset: 71) in her mother. OBJECTIVE:   VITALS:  height is 5' 2\" (1.575 m) and weight is 145 lb (65.8 kg). Her temporal temperature is 98.2 °F (36.8 °C). Her blood pressure is 133/76 and her pulse is 78. Her respiration is 16 and oxygen saturation is 99%. CONSTITUTIONAL:Alert and orientated to person, place and time. No acute distress. Friendly. SKIN:  Warm & dry, no rashes on exposed skin  HEENT: HEAD: Normocephalic, atraumatic. EYES:  PERRL, EOMs intact, conjunctiva clear, wearing glasses       EARS:  Equal bilaterally, no edema or thickening, skin is intact without lumps or lesions. No discharge.       NOSE:  Nares patent, septum midline, no rhinorrhea      MOUTH/THROAT:  Mucous membranes moist, tongue is pink, uvula midline, teeth appear to be intact  NECK:  Supple, no lymphadenopathy, full ROM  LUNGS: Respirations even and non-labored. Clear to auscultation bilaterally, no wheezes/rales/rhonchi   CARDIOVASCULAR: regular rate and rhythm, no murmurs/rubs/gallops   ABDOMEN: soft, non-tender, non-distended, bowel sounds active x 4   MUSCULOSKELETAL: Full ROM bilateral upper extremities, Full ROM bilateral lower extremities. Strength of 5/5 bilateral upper extremities. Strength 5/5 bilateral lower extremities. VASCULAR:  Brisk cap refill bilateral fingers. Radial pulses are intact, 2+ bilaterally. Dorsalis pedis pulse 2+ bilaterally. No edema or varicosities bilateral lower extremities  NEUROLOGIC: CN II-XII are grossly intact. Gait not assessed. IMPRESSIONS:   1. History of colon polyp      Diagnosis Date    Caffeine use     1-2 coffee, 2-3 cans soda daily    Cervical stenosis of spine     left hand grasp weakness, Intermittent N/T bilat hands left worse, non - surgical per pt    Fibromyalgia     H/O gestational diabetes mellitus, not currently pregnant     History of bone density study 5/12    History of colon polyps     HTN (hypertension)     Hypercholesterolemia     Insulin resistance     Microhematuria     idiopathic, follows with dr. Yo Banerjee annually    Mitral regurgitation     Osteopenia     Palpitations     SI (sacroiliac) joint dysfunction 2012    Type II or unspecified type diabetes mellitus without mention of complication, not stated as uncontrolled     Wears glasses    2. PLANS:   1.  Colonoscopy     RAMIREZ SPENCER  Electronically signed 5/5/2020 at 7:17 AM

## 2020-05-05 NOTE — ANESTHESIA PRE PROCEDURE
02/05/2019    GFRAA >60 02/05/2019    LABGLOM >60 05/05/2020    GLUCOSE 95 02/05/2019    GLUCOSE 82 03/01/2012    PROT 6.8 02/05/2019    CALCIUM 9.7 02/05/2019    BILITOT 0.37 02/05/2019    ALKPHOS 54 02/05/2019    AST 21 02/05/2019    ALT 14 05/16/2019       BMP    Lab Results   Component Value Date     02/05/2019    K 4.3 02/05/2019     02/05/2019    CO2 26 02/05/2019    BUN 13 02/05/2019    CREATININE 0.69 05/05/2020    CREATININE 0.71 02/05/2019    CALCIUM 9.7 02/05/2019    GFRAA >60 02/05/2019    LABGLOM >60 05/05/2020    GLUCOSE 95 02/05/2019    GLUCOSE 82 03/01/2012       POC Testing  Recent Labs     05/05/20  0649   POCGLU 101*   POCK 3.6       Coags  No results found for: PROTIME, INR, APTT    HCG (If Applicable) No results found for: PREGTESTUR, PREGSERUM, HCG, HCGQUANT     ABGs No results found for: PHART, PO2ART, RHQ3UCC, XMV1SRQ, BEART, [id number]     Type & Screen (If Applicable)  No results found for: [facility]    Radiology (If Applicable)    Cardiac Testing (If Applicable)   EF 51%, mild MR  EKG (If Applicable) nl          Medications prior to admission:   Prior to Admission medications    Medication Sig Start Date End Date Taking? Authorizing Provider   ibuprofen (ADVIL;MOTRIN) 800 MG tablet ibuprofen 800 mg tablet   TAKE (1) TABLET THREE TIMES DAILY AS NEEDED. Yes Historical Provider, MD   metFORMIN (GLUCOPHAGE) 500 MG tablet Take 500 mg by mouth nightly   Yes Historical Provider, MD   rosuvastatin (CRESTOR) 10 MG tablet Take 10 mg by mouth nightly   Yes Historical Provider, MD   hydrochlorothiazide (MICROZIDE) 12.5 MG capsule Take 12.5 mg by mouth daily. Yes Historical Provider, MD   lisinopril (PRINIVIL;ZESTRIL) 5 MG tablet Take 5 mg by mouth daily. Yes Historical Provider, MD   Calcium Carbonate-Vit D-Min 600-200 MG-UNIT TABS Take 1 tablet by mouth nightly    Yes Historical Provider, MD   aspirin 81 MG EC tablet Take 81 mg by mouth daily.      Yes Historical Provider, MD

## 2020-05-06 LAB — SURGICAL PATHOLOGY REPORT: NORMAL

## 2020-06-04 ENCOUNTER — HOSPITAL ENCOUNTER (OUTPATIENT)
Dept: MAMMOGRAPHY | Age: 65
Discharge: HOME | End: 2020-06-06
Payer: COMMERCIAL

## 2020-06-04 PROCEDURE — 77080 DXA BONE DENSITY AXIAL: CPT

## 2020-06-04 PROCEDURE — 77063 BREAST TOMOSYNTHESIS BI: CPT

## 2020-10-20 ENCOUNTER — HOSPITAL ENCOUNTER (OUTPATIENT)
Age: 65
Setting detail: SPECIMEN
Discharge: HOME | End: 2020-10-20
Payer: COMMERCIAL

## 2020-10-20 LAB
-: ABNORMAL
ALBUMIN SERPL-MCNC: 4.5 G/DL (ref 3.5–5.2)
ALBUMIN/GLOBULIN RATIO: 2 (ref 1–2.5)
ALP BLD-CCNC: 64 U/L (ref 35–104)
ALT SERPL-CCNC: 18 U/L (ref 5–33)
AMORPHOUS: ABNORMAL
ANION GAP SERPL CALCULATED.3IONS-SCNC: 13 MMOL/L (ref 9–17)
AST SERPL-CCNC: 17 U/L
BACTERIA: ABNORMAL
BILIRUB SERPL-MCNC: 0.2 MG/DL (ref 0.3–1.2)
BILIRUBIN URINE: NEGATIVE
BUN BLDV-MCNC: 13 MG/DL (ref 8–23)
BUN/CREAT BLD: ABNORMAL (ref 9–20)
CALCIUM SERPL-MCNC: 9.6 MG/DL (ref 8.6–10.4)
CASTS UA: ABNORMAL /LPF (ref 0–8)
CHLORIDE BLD-SCNC: 100 MMOL/L (ref 98–107)
CHOLESTEROL/HDL RATIO: 2
CHOLESTEROL: 119 MG/DL
CO2: 26 MMOL/L (ref 20–31)
COLOR: YELLOW
COMMENT UA: ABNORMAL
CREAT SERPL-MCNC: 0.72 MG/DL (ref 0.5–0.9)
CRYSTALS, UA: ABNORMAL /HPF
EPITHELIAL CELLS UA: ABNORMAL /HPF (ref 0–5)
GFR AFRICAN AMERICAN: >60 ML/MIN
GFR NON-AFRICAN AMERICAN: >60 ML/MIN
GFR SERPL CREATININE-BSD FRML MDRD: ABNORMAL ML/MIN/{1.73_M2}
GFR SERPL CREATININE-BSD FRML MDRD: ABNORMAL ML/MIN/{1.73_M2}
GLUCOSE BLD-MCNC: 92 MG/DL (ref 70–99)
GLUCOSE URINE: NEGATIVE
HDLC SERPL-MCNC: 61 MG/DL
KETONES, URINE: NEGATIVE
LDL CHOLESTEROL: 37 MG/DL (ref 0–130)
LEUKOCYTE ESTERASE, URINE: ABNORMAL
MUCUS: ABNORMAL
NITRITE, URINE: NEGATIVE
OTHER OBSERVATIONS UA: ABNORMAL
PH UA: 5.5 (ref 5–8)
POTASSIUM SERPL-SCNC: 4 MMOL/L (ref 3.7–5.3)
PROTEIN UA: NEGATIVE
RBC UA: ABNORMAL /HPF (ref 0–4)
RENAL EPITHELIAL, UA: ABNORMAL /HPF
SODIUM BLD-SCNC: 139 MMOL/L (ref 135–144)
SPECIFIC GRAVITY UA: 1.01 (ref 1–1.03)
TOTAL PROTEIN: 6.7 G/DL (ref 6.4–8.3)
TRICHOMONAS: ABNORMAL
TRIGL SERPL-MCNC: 104 MG/DL
TURBIDITY: CLEAR
URINE HGB: ABNORMAL
UROBILINOGEN, URINE: NORMAL
VITAMIN D 25-HYDROXY: 26.7 NG/ML (ref 30–100)
VLDLC SERPL CALC-MCNC: NORMAL MG/DL (ref 1–30)
WBC UA: ABNORMAL /HPF (ref 0–5)
YEAST: ABNORMAL

## 2021-10-14 ENCOUNTER — HOSPITAL ENCOUNTER (OUTPATIENT)
Age: 66
Setting detail: SPECIMEN
Discharge: HOME | End: 2021-10-14
Payer: COMMERCIAL

## 2021-10-14 LAB
ALBUMIN SERPL-MCNC: 4.3 G/DL (ref 3.5–5.2)
ALBUMIN/GLOBULIN RATIO: 2 (ref 1–2.5)
ALP BLD-CCNC: 61 U/L (ref 35–104)
ALT SERPL-CCNC: 15 U/L (ref 5–33)
ANION GAP SERPL CALCULATED.3IONS-SCNC: 13 MMOL/L (ref 9–17)
AST SERPL-CCNC: 18 U/L
BILIRUB SERPL-MCNC: 0.31 MG/DL (ref 0.3–1.2)
BUN BLDV-MCNC: 15 MG/DL (ref 8–23)
BUN/CREAT BLD: ABNORMAL (ref 9–20)
CALCIUM SERPL-MCNC: 10 MG/DL (ref 8.6–10.4)
CHLORIDE BLD-SCNC: 103 MMOL/L (ref 98–107)
CHOLESTEROL/HDL RATIO: 2.3
CHOLESTEROL: 127 MG/DL
CO2: 25 MMOL/L (ref 20–31)
CREAT SERPL-MCNC: 0.68 MG/DL (ref 0.5–0.9)
CREATININE URINE: 52.5 MG/DL (ref 28–217)
GFR AFRICAN AMERICAN: >60 ML/MIN
GFR NON-AFRICAN AMERICAN: >60 ML/MIN
GFR SERPL CREATININE-BSD FRML MDRD: ABNORMAL ML/MIN/{1.73_M2}
GFR SERPL CREATININE-BSD FRML MDRD: ABNORMAL ML/MIN/{1.73_M2}
GLUCOSE BLD-MCNC: 96 MG/DL (ref 70–99)
HDLC SERPL-MCNC: 56 MG/DL
LDL CHOLESTEROL: 35 MG/DL (ref 0–130)
MICROALBUMIN/CREAT 24H UR: <12 MG/L
MICROALBUMIN/CREAT UR-RTO: NORMAL MCG/MG CREAT
POTASSIUM SERPL-SCNC: 3.6 MMOL/L (ref 3.7–5.3)
SODIUM BLD-SCNC: 141 MMOL/L (ref 135–144)
TOTAL PROTEIN: 6.5 G/DL (ref 6.4–8.3)
TRIGL SERPL-MCNC: 182 MG/DL
VLDLC SERPL CALC-MCNC: ABNORMAL MG/DL (ref 1–30)

## 2021-10-15 LAB
ESTIMATED AVERAGE GLUCOSE: 97 MG/DL
HBA1C MFR BLD: 5 % (ref 4–6)

## 2021-11-08 ENCOUNTER — HOSPITAL ENCOUNTER (OUTPATIENT)
Age: 66
Setting detail: SPECIMEN
Discharge: HOME | End: 2021-11-08
Payer: COMMERCIAL

## 2021-11-08 LAB
MAGNESIUM: 2 MG/DL (ref 1.6–2.6)
POTASSIUM SERPL-SCNC: 4.2 MMOL/L (ref 3.7–5.3)

## 2021-12-14 ENCOUNTER — HOSPITAL ENCOUNTER (OUTPATIENT)
Dept: MAMMOGRAPHY | Age: 66
Discharge: HOME | End: 2021-12-16
Payer: COMMERCIAL

## 2021-12-14 VITALS — HEIGHT: 62 IN | BODY MASS INDEX: 24.84 KG/M2 | WEIGHT: 135 LBS

## 2021-12-14 DIAGNOSIS — Z12.31 ENCOUNTER FOR SCREENING MAMMOGRAM FOR MALIGNANT NEOPLASM OF BREAST: ICD-10-CM

## 2021-12-14 PROCEDURE — 77063 BREAST TOMOSYNTHESIS BI: CPT

## 2022-11-01 ENCOUNTER — HOSPITAL ENCOUNTER (OUTPATIENT)
Dept: MAMMOGRAPHY | Age: 67
Discharge: HOME | End: 2022-11-03
Payer: MEDICARE

## 2022-11-01 ENCOUNTER — HOSPITAL ENCOUNTER (OUTPATIENT)
Age: 67
Setting detail: SPECIMEN
Discharge: HOME | End: 2022-11-01

## 2022-11-01 DIAGNOSIS — Z78.0 MENOPAUSE PRESENT: ICD-10-CM

## 2022-11-01 LAB
ALBUMIN SERPL-MCNC: 4.6 G/DL (ref 3.5–5.2)
ALBUMIN/GLOBULIN RATIO: 2.2 (ref 1–2.5)
ALP BLD-CCNC: 62 U/L (ref 35–104)
ALT SERPL-CCNC: 13 U/L (ref 5–33)
ANION GAP SERPL CALCULATED.3IONS-SCNC: 15 MMOL/L (ref 9–17)
AST SERPL-CCNC: 19 U/L
BILIRUB SERPL-MCNC: 0.3 MG/DL (ref 0.3–1.2)
BILIRUBIN URINE: NEGATIVE
BUN BLDV-MCNC: 14 MG/DL (ref 8–23)
CALCIUM SERPL-MCNC: 9.6 MG/DL (ref 8.6–10.4)
CASTS UA: NORMAL /LPF (ref 0–8)
CHLORIDE BLD-SCNC: 104 MMOL/L (ref 98–107)
CHOLESTEROL/HDL RATIO: 2.5
CHOLESTEROL: 135 MG/DL
CO2: 24 MMOL/L (ref 20–31)
COLOR: YELLOW
CREAT SERPL-MCNC: 0.72 MG/DL (ref 0.5–0.9)
EPITHELIAL CELLS UA: NORMAL /HPF (ref 0–5)
GFR SERPL CREATININE-BSD FRML MDRD: >60 ML/MIN/1.73M2
GLUCOSE BLD-MCNC: 93 MG/DL (ref 70–99)
GLUCOSE URINE: NEGATIVE
HDLC SERPL-MCNC: 53 MG/DL
KETONES, URINE: NEGATIVE
LDL CHOLESTEROL: 57 MG/DL (ref 0–130)
LEUKOCYTE ESTERASE, URINE: ABNORMAL
NITRITE, URINE: NEGATIVE
PH UA: 7.5 (ref 5–8)
POTASSIUM SERPL-SCNC: 4.7 MMOL/L (ref 3.7–5.3)
PROTEIN UA: NEGATIVE
RBC UA: NORMAL /HPF (ref 0–4)
SODIUM BLD-SCNC: 143 MMOL/L (ref 135–144)
SPECIFIC GRAVITY UA: 1.01 (ref 1–1.03)
TOTAL PROTEIN: 6.7 G/DL (ref 6.4–8.3)
TRIGL SERPL-MCNC: 127 MG/DL
TURBIDITY: CLEAR
URINE HGB: ABNORMAL
UROBILINOGEN, URINE: NORMAL
WBC UA: NORMAL /HPF (ref 0–5)

## 2022-11-01 PROCEDURE — 77080 DXA BONE DENSITY AXIAL: CPT

## 2022-11-02 LAB
ESTIMATED AVERAGE GLUCOSE: 103 MG/DL
HBA1C MFR BLD: 5.2 % (ref 4–6)

## 2023-05-17 ENCOUNTER — HOSPITAL ENCOUNTER (OUTPATIENT)
Dept: MAMMOGRAPHY | Age: 68
Discharge: HOME | End: 2023-05-19
Payer: MEDICARE

## 2023-05-17 DIAGNOSIS — Z12.31 ENCOUNTER FOR SCREENING MAMMOGRAM FOR MALIGNANT NEOPLASM OF BREAST: ICD-10-CM

## 2023-05-17 PROCEDURE — 77063 BREAST TOMOSYNTHESIS BI: CPT

## 2023-10-06 ENCOUNTER — HOSPITAL ENCOUNTER (OUTPATIENT)
Age: 68
Setting detail: SPECIMEN
Discharge: HOME | End: 2023-10-06

## 2023-10-06 LAB
ALBUMIN SERPL-MCNC: 4.5 G/DL (ref 3.5–5.2)
ALBUMIN/GLOB SERPL: 1.9 {RATIO} (ref 1–2.5)
ALP SERPL-CCNC: 66 U/L (ref 35–104)
ALT SERPL-CCNC: 17 U/L (ref 5–33)
ANION GAP SERPL CALCULATED.3IONS-SCNC: 9 MMOL/L (ref 9–17)
AST SERPL-CCNC: 19 U/L
BILIRUB SERPL-MCNC: 0.5 MG/DL (ref 0.3–1.2)
BILIRUB UR QL STRIP: NEGATIVE
BUN SERPL-MCNC: 15 MG/DL (ref 8–23)
CALCIUM SERPL-MCNC: 10 MG/DL (ref 8.6–10.4)
CHLORIDE SERPL-SCNC: 103 MMOL/L (ref 98–107)
CHOLEST SERPL-MCNC: 148 MG/DL
CHOLESTEROL/HDL RATIO: 2.5
CLARITY UR: CLEAR
CO2 SERPL-SCNC: 28 MMOL/L (ref 20–31)
COLOR UR: YELLOW
CREAT SERPL-MCNC: 0.7 MG/DL (ref 0.5–0.9)
CREAT UR-MCNC: 41.7 MG/DL (ref 28–217)
EPI CELLS #/AREA URNS HPF: NORMAL /HPF (ref 0–5)
GFR SERPL CREATININE-BSD FRML MDRD: >60 ML/MIN/1.73M2
GLUCOSE SERPL-MCNC: 92 MG/DL (ref 70–99)
GLUCOSE UR STRIP-MCNC: NEGATIVE MG/DL
HDLC SERPL-MCNC: 59 MG/DL
HGB UR QL STRIP.AUTO: ABNORMAL
KETONES UR STRIP-MCNC: NEGATIVE MG/DL
LDLC SERPL CALC-MCNC: 44 MG/DL (ref 0–130)
LEUKOCYTE ESTERASE UR QL STRIP: ABNORMAL
MICROALBUMIN UR-MCNC: <12 MG/L
MICROALBUMIN/CREAT UR-RTO: NORMAL MCG/MG CREAT
NITRITE UR QL STRIP: NEGATIVE
PH UR STRIP: 6.5 [PH] (ref 5–8)
POTASSIUM SERPL-SCNC: 4.6 MMOL/L (ref 3.7–5.3)
PROT SERPL-MCNC: 6.9 G/DL (ref 6.4–8.3)
PROT UR STRIP-MCNC: NEGATIVE MG/DL
RBC #/AREA URNS HPF: NORMAL /HPF (ref 0–4)
SODIUM SERPL-SCNC: 140 MMOL/L (ref 135–144)
SP GR UR STRIP: 1.01 (ref 1–1.03)
TRIGL SERPL-MCNC: 227 MG/DL
UROBILINOGEN UR STRIP-ACNC: NORMAL EU/DL (ref 0–1)
WBC #/AREA URNS HPF: NORMAL /HPF (ref 0–5)

## 2023-10-07 LAB
EST. AVERAGE GLUCOSE BLD GHB EST-MCNC: 85 MG/DL
HBA1C MFR BLD: 4.6 % (ref 4–6)

## 2023-10-09 LAB — APO B100 SERPL-MCNC: 66 MG/DL (ref 60–117)

## 2024-05-28 ENCOUNTER — HOSPITAL ENCOUNTER (OUTPATIENT)
Dept: MAMMOGRAPHY | Age: 69
Discharge: HOME | End: 2024-05-30
Payer: MEDICARE

## 2024-05-28 VITALS — BODY MASS INDEX: 25.76 KG/M2 | WEIGHT: 140 LBS | HEIGHT: 62 IN

## 2024-05-28 DIAGNOSIS — Z12.31 ENCOUNTER FOR SCREENING MAMMOGRAM FOR MALIGNANT NEOPLASM OF BREAST: ICD-10-CM

## 2024-05-28 PROCEDURE — 77063 BREAST TOMOSYNTHESIS BI: CPT

## 2024-10-15 ENCOUNTER — HOSPITAL ENCOUNTER (OUTPATIENT)
Age: 69
Setting detail: SPECIMEN
Discharge: HOME | End: 2024-10-15

## 2024-10-15 LAB
ALBUMIN SERPL-MCNC: 4.6 G/DL (ref 3.5–5.2)
ALBUMIN/GLOB SERPL: 2 {RATIO} (ref 1–2.5)
ALP SERPL-CCNC: 60 U/L (ref 35–104)
ALT SERPL-CCNC: 13 U/L (ref 10–35)
ANION GAP SERPL CALCULATED.3IONS-SCNC: 10 MMOL/L (ref 9–16)
AST SERPL-CCNC: 18 U/L (ref 10–35)
BACTERIA URNS QL MICRO: NORMAL
BILIRUB SERPL-MCNC: 0.4 MG/DL (ref 0–1.2)
BILIRUB UR QL STRIP: NEGATIVE
BUN SERPL-MCNC: 14 MG/DL (ref 8–23)
CALCIUM SERPL-MCNC: 10 MG/DL (ref 8.6–10.4)
CASTS #/AREA URNS LPF: NORMAL /LPF (ref 0–8)
CHLORIDE SERPL-SCNC: 107 MMOL/L (ref 98–107)
CHOLEST SERPL-MCNC: 158 MG/DL (ref 0–199)
CHOLESTEROL/HDL RATIO: 3
CLARITY UR: CLEAR
CO2 SERPL-SCNC: 25 MMOL/L (ref 20–31)
COLOR UR: YELLOW
CREAT SERPL-MCNC: 0.7 MG/DL (ref 0.5–0.9)
EPI CELLS #/AREA URNS HPF: NORMAL /HPF (ref 0–5)
EST. AVERAGE GLUCOSE BLD GHB EST-MCNC: 108 MG/DL
GFR, ESTIMATED: >90 ML/MIN/1.73M2
GLUCOSE SERPL-MCNC: 96 MG/DL (ref 74–99)
GLUCOSE UR STRIP-MCNC: NEGATIVE MG/DL
HBA1C MFR BLD: 5.4 % (ref 4–6)
HDLC SERPL-MCNC: 59 MG/DL
HGB UR QL STRIP.AUTO: ABNORMAL
KETONES UR STRIP-MCNC: NEGATIVE MG/DL
LDLC SERPL CALC-MCNC: 64 MG/DL (ref 0–100)
LEUKOCYTE ESTERASE UR QL STRIP: NEGATIVE
NITRITE UR QL STRIP: NEGATIVE
PH UR STRIP: 5.5 [PH] (ref 5–8)
POTASSIUM SERPL-SCNC: 4.2 MMOL/L (ref 3.7–5.3)
PROT SERPL-MCNC: 6.9 G/DL (ref 6.6–8.7)
PROT UR STRIP-MCNC: NEGATIVE MG/DL
RBC #/AREA URNS HPF: NORMAL /HPF (ref 0–4)
SODIUM SERPL-SCNC: 142 MMOL/L (ref 136–145)
SP GR UR STRIP: 1.01 (ref 1–1.03)
TRIGL SERPL-MCNC: 175 MG/DL
UROBILINOGEN UR STRIP-ACNC: NORMAL EU/DL (ref 0–1)
VLDLC SERPL CALC-MCNC: 35 MG/DL
WBC #/AREA URNS HPF: NORMAL /HPF (ref 0–5)

## 2025-04-28 ENCOUNTER — TRANSCRIBE ORDERS (OUTPATIENT)
Dept: ADMINISTRATIVE | Age: 70
End: 2025-04-28

## 2025-04-28 DIAGNOSIS — Z12.31 ENCOUNTER FOR SCREENING MAMMOGRAM FOR MALIGNANT NEOPLASM OF BREAST: Primary | ICD-10-CM

## 2025-06-04 ENCOUNTER — HOSPITAL ENCOUNTER (OUTPATIENT)
Dept: MAMMOGRAPHY | Age: 70
Discharge: HOME | End: 2025-06-06
Payer: MEDICARE

## 2025-06-04 VITALS — HEIGHT: 62 IN | BODY MASS INDEX: 25.76 KG/M2 | WEIGHT: 140 LBS

## 2025-06-04 DIAGNOSIS — Z12.31 ENCOUNTER FOR SCREENING MAMMOGRAM FOR MALIGNANT NEOPLASM OF BREAST: ICD-10-CM

## 2025-06-04 PROCEDURE — 77063 BREAST TOMOSYNTHESIS BI: CPT

## (undated) DEVICE — FORCEPS BX L240CM JAW DIA2.2MM RAD JAW 4 HOT DISP

## (undated) DEVICE — DISPOSABLE DISTAL ATTACHMENT: Brand: DISPOSABLE DISTAL ATTACHMENT

## (undated) DEVICE — ELECTRODE PT RET AD L9FT HI MOIST COND ADH HYDRGEL CORDED

## (undated) DEVICE — SNARE ENDOSCP M L240CM LOOP W27MM SHTH DIA2.4MM OVL FLX

## (undated) DEVICE — GENERATOR ERBE

## (undated) DEVICE — FORCEPS BX L240CM WRK CHN 2.8MM STD CAP W/ NDL MIC MESH

## (undated) DEVICE — AGENT CNTRST 10ML AMP INJ ELEVIEW 391530190015] ARIES PHARMACEUTICALS INC]

## (undated) DEVICE — CLIP LIG L235CM RESOL 360 BX/20

## (undated) DEVICE — NEEDLE SCLERO 25GA L240CM OD0.51MM ID0.24MM EXTN L4MM SHTH

## (undated) DEVICE — FIAPC® PROBE W/ FILTER 2200 A OD 2.3MM/6.9FR; L 2.2M/7.2FT: Brand: ERBE

## (undated) DEVICE — SNARE POLYP M W27MMXL240CM OVL STIFF DISP CAPTIVATOR